# Patient Record
Sex: MALE | Race: BLACK OR AFRICAN AMERICAN | Employment: FULL TIME | URBAN - METROPOLITAN AREA
[De-identification: names, ages, dates, MRNs, and addresses within clinical notes are randomized per-mention and may not be internally consistent; named-entity substitution may affect disease eponyms.]

---

## 2018-03-21 ENCOUNTER — APPOINTMENT (OUTPATIENT)
Dept: GENERAL RADIOLOGY | Age: 38
End: 2018-03-21
Attending: PHYSICIAN ASSISTANT
Payer: SELF-PAY

## 2018-03-21 ENCOUNTER — HOSPITAL ENCOUNTER (EMERGENCY)
Age: 38
Discharge: HOME | End: 2018-03-21
Attending: EMERGENCY MEDICINE
Payer: SELF-PAY

## 2018-03-21 VITALS
WEIGHT: 176.8 LBS | RESPIRATION RATE: 18 BRPM | DIASTOLIC BLOOD PRESSURE: 89 MMHG | TEMPERATURE: 98.2 F | BODY MASS INDEX: 23.95 KG/M2 | HEART RATE: 85 BPM | SYSTOLIC BLOOD PRESSURE: 127 MMHG | OXYGEN SATURATION: 100 % | HEIGHT: 72 IN

## 2018-03-21 DIAGNOSIS — A08.4 VIRAL GASTROENTERITIS: Primary | ICD-10-CM

## 2018-03-21 LAB
ALBUMIN SERPL-MCNC: 4.4 G/DL (ref 3.5–5)
ALBUMIN/GLOB SERPL: 1.4 {RATIO} (ref 1.1–2.2)
ALP SERPL-CCNC: 79 U/L (ref 45–117)
ALT SERPL-CCNC: 16 U/L (ref 12–78)
ANION GAP SERPL CALC-SCNC: 14 MMOL/L (ref 5–15)
AST SERPL-CCNC: 23 U/L (ref 15–37)
ATRIAL RATE: 80 BPM
BASOPHILS # BLD: 0 K/UL (ref 0–0.1)
BASOPHILS NFR BLD: 0 % (ref 0–1)
BILIRUB SERPL-MCNC: 0.6 MG/DL (ref 0.2–1)
BUN SERPL-MCNC: 14 MG/DL (ref 6–20)
BUN/CREAT SERPL: 14 (ref 12–20)
CALCIUM SERPL-MCNC: 9.3 MG/DL (ref 8.5–10.1)
CALCULATED P AXIS, ECG09: 75 DEGREES
CALCULATED R AXIS, ECG10: 70 DEGREES
CALCULATED T AXIS, ECG11: 59 DEGREES
CHLORIDE SERPL-SCNC: 102 MMOL/L (ref 97–108)
CO2 SERPL-SCNC: 23 MMOL/L (ref 21–32)
CREAT SERPL-MCNC: 1.02 MG/DL (ref 0.7–1.3)
DIAGNOSIS, 93000: NORMAL
DIFFERENTIAL METHOD BLD: ABNORMAL
EOSINOPHIL # BLD: 0 K/UL (ref 0–0.4)
EOSINOPHIL NFR BLD: 0 % (ref 0–7)
ERYTHROCYTE [DISTWIDTH] IN BLOOD BY AUTOMATED COUNT: 14.5 % (ref 11.5–14.5)
GLOBULIN SER CALC-MCNC: 3.2 G/DL (ref 2–4)
GLUCOSE SERPL-MCNC: 73 MG/DL (ref 65–100)
HCT VFR BLD AUTO: 43.6 % (ref 36.6–50.3)
HGB BLD-MCNC: 14.6 G/DL (ref 12.1–17)
IMM GRANULOCYTES # BLD: 0.1 K/UL (ref 0–0.04)
IMM GRANULOCYTES NFR BLD AUTO: 0 % (ref 0–0.5)
LIPASE SERPL-CCNC: 131 U/L (ref 73–393)
LYMPHOCYTES # BLD: 1.3 K/UL (ref 0.8–3.5)
LYMPHOCYTES NFR BLD: 6 % (ref 12–49)
MCH RBC QN AUTO: 30.4 PG (ref 26–34)
MCHC RBC AUTO-ENTMCNC: 33.5 G/DL (ref 30–36.5)
MCV RBC AUTO: 90.8 FL (ref 80–99)
MONOCYTES # BLD: 1.4 K/UL (ref 0–1)
MONOCYTES NFR BLD: 7 % (ref 5–13)
NEUTS SEG # BLD: 18 K/UL (ref 1.8–8)
NEUTS SEG NFR BLD: 87 % (ref 32–75)
NRBC # BLD: 0 K/UL (ref 0–0.01)
NRBC BLD-RTO: 0 PER 100 WBC
P-R INTERVAL, ECG05: 150 MS
PLATELET # BLD AUTO: 250 K/UL (ref 150–400)
PMV BLD AUTO: 10.9 FL (ref 8.9–12.9)
POTASSIUM SERPL-SCNC: 3.6 MMOL/L (ref 3.5–5.1)
PROT SERPL-MCNC: 7.6 G/DL (ref 6.4–8.2)
Q-T INTERVAL, ECG07: 374 MS
QRS DURATION, ECG06: 92 MS
QTC CALCULATION (BEZET), ECG08: 431 MS
RBC # BLD AUTO: 4.8 M/UL (ref 4.1–5.7)
SODIUM SERPL-SCNC: 139 MMOL/L (ref 136–145)
TROPONIN I SERPL-MCNC: <0.04 NG/ML
VENTRICULAR RATE, ECG03: 80 BPM
WBC # BLD AUTO: 20.8 K/UL (ref 4.1–11.1)

## 2018-03-21 PROCEDURE — 96374 THER/PROPH/DIAG INJ IV PUSH: CPT

## 2018-03-21 PROCEDURE — 99283 EMERGENCY DEPT VISIT LOW MDM: CPT

## 2018-03-21 PROCEDURE — 93005 ELECTROCARDIOGRAM TRACING: CPT

## 2018-03-21 PROCEDURE — 83690 ASSAY OF LIPASE: CPT | Performed by: NURSE PRACTITIONER

## 2018-03-21 PROCEDURE — 85025 COMPLETE CBC W/AUTO DIFF WBC: CPT | Performed by: PHYSICIAN ASSISTANT

## 2018-03-21 PROCEDURE — 71046 X-RAY EXAM CHEST 2 VIEWS: CPT

## 2018-03-21 PROCEDURE — 84484 ASSAY OF TROPONIN QUANT: CPT | Performed by: PHYSICIAN ASSISTANT

## 2018-03-21 PROCEDURE — 96361 HYDRATE IV INFUSION ADD-ON: CPT

## 2018-03-21 PROCEDURE — 80053 COMPREHEN METABOLIC PANEL: CPT | Performed by: PHYSICIAN ASSISTANT

## 2018-03-21 PROCEDURE — 74011000250 HC RX REV CODE- 250

## 2018-03-21 PROCEDURE — 74011250636 HC RX REV CODE- 250/636: Performed by: NURSE PRACTITIONER

## 2018-03-21 PROCEDURE — 36415 COLL VENOUS BLD VENIPUNCTURE: CPT | Performed by: NURSE PRACTITIONER

## 2018-03-21 PROCEDURE — 96375 TX/PRO/DX INJ NEW DRUG ADDON: CPT

## 2018-03-21 RX ORDER — FAMOTIDINE 10 MG/ML
INJECTION INTRAVENOUS
Status: COMPLETED
Start: 2018-03-21 | End: 2018-03-21

## 2018-03-21 RX ORDER — KETOROLAC TROMETHAMINE 30 MG/ML
30 INJECTION, SOLUTION INTRAMUSCULAR; INTRAVENOUS
Status: COMPLETED | OUTPATIENT
Start: 2018-03-21 | End: 2018-03-21

## 2018-03-21 RX ORDER — ONDANSETRON 2 MG/ML
4 INJECTION INTRAMUSCULAR; INTRAVENOUS
Status: COMPLETED | OUTPATIENT
Start: 2018-03-21 | End: 2018-03-21

## 2018-03-21 RX ORDER — ONDANSETRON 4 MG/1
4 TABLET, ORALLY DISINTEGRATING ORAL
Qty: 20 TAB | Refills: 0 | Status: SHIPPED | OUTPATIENT
Start: 2018-03-21

## 2018-03-21 RX ADMIN — ONDANSETRON 4 MG: 2 INJECTION INTRAMUSCULAR; INTRAVENOUS at 14:28

## 2018-03-21 RX ADMIN — KETOROLAC TROMETHAMINE 30 MG: 30 INJECTION, SOLUTION INTRAMUSCULAR at 14:28

## 2018-03-21 RX ADMIN — FAMOTIDINE: 10 INJECTION, SOLUTION INTRAVENOUS at 14:28

## 2018-03-21 RX ADMIN — SODIUM CHLORIDE 1000 ML: 900 INJECTION, SOLUTION INTRAVENOUS at 14:27

## 2018-03-21 NOTE — LETTER
NOTIFICATION RETURN TO WORK / SCHOOL 
 
3/21/2018 3:47 PM 
 
Mr. Umu Castaneda [place] [address] [place], [place] [id number] To Whom It May Concern: 
 
Umu Castaneda is currently under the care of [address]. He will return to work/school on:  March 23, 2018 If there are questions or concerns please have the patient contact our office.  
 
 
 
Sincerely, 
 
 
 
Timo Gómez NP 
3:47 PM

## 2018-03-21 NOTE — DISCHARGE INSTRUCTIONS
Gastroenteritis: Care Instructions  Your Care Instructions    Gastroenteritis is an illness that may cause nausea, vomiting, and diarrhea. It is sometimes called \"stomach flu. \" It can be caused by bacteria or a virus. You will probably begin to feel better in 1 to 2 days. In the meantime, get plenty of rest and make sure you do not become dehydrated. Dehydration occurs when your body loses too much fluid. Follow-up care is a key part of your treatment and safety. Be sure to make and go to all appointments, and call your doctor if you are having problems. It's also a good idea to know your test results and keep a list of the medicines you take. How can you care for yourself at home? · If your doctor prescribed antibiotics, take them as directed. Do not stop taking them just because you feel better. You need to take the full course of antibiotics. · Drink plenty of fluids to prevent dehydration, enough so that your urine is light yellow or clear like water. Choose water and other caffeine-free clear liquids until you feel better. If you have kidney, heart, or liver disease and have to limit fluids, talk with your doctor before you increase your fluid intake. · Drink fluids slowly, in frequent, small amounts, because drinking too much too fast can cause vomiting. · Begin eating mild foods, such as dry toast, yogurt, applesauce, bananas, and rice. Avoid spicy, hot, or high-fat foods, and do not drink alcohol or caffeine for a day or two. Do not drink milk or eat ice cream until you are feeling better. How to prevent gastroenteritis  · Keep hot foods hot and cold foods cold. · Do not eat meats, dressings, salads, or other foods that have been kept at room temperature for more than 2 hours. · Use a thermometer to check your refrigerator. It should be between 34°F and 40°F.  · Defrost meats in the refrigerator or microwave, not on the kitchen counter. · Keep your hands and your kitchen clean.  Wash your hands, cutting boards, and countertops with hot soapy water frequently. · Cook meat until it is well done. · Do not eat raw eggs or uncooked sauces made with raw eggs. · Do not take chances. If food looks or tastes spoiled, throw it out. When should you call for help? Call 911 anytime you think you may need emergency care. For example, call if:  ? · You vomit blood or what looks like coffee grounds. ? · You passed out (lost consciousness). ? · You pass maroon or very bloody stools. ?Call your doctor now or seek immediate medical care if:  ? · You have severe belly pain. ? · You have signs of needing more fluids. You have sunken eyes, a dry mouth, and pass only a little dark urine. ? · You feel like you are going to faint. ? · You have increased belly pain that does not go away in 1 to 2 days. ? · You have new or increased nausea, or you are vomiting. ? · You have a new or higher fever. ? · Your stools are black and tarlike or have streaks of blood. ? Watch closely for changes in your health, and be sure to contact your doctor if:  ? · You are dizzy or lightheaded. ? · You urinate less than usual, or your urine is dark yellow or brown. ? · You do not feel better with each day that goes by. Where can you learn more? Go to http://selene-javon.info/. Enter N142 in the search box to learn more about \"Gastroenteritis: Care Instructions. \"  Current as of: March 3, 2017  Content Version: 11.4  © 1515-0748 [employer]. Care instructions adapted under license by [employer] (which disclaims liability or warranty for this information). If you have questions about a medical condition or this instruction, always ask your healthcare professional. [address] any warranty or liability for your use of this information. We hope that we have addressed all of your medical concerns.  The examination and treatment you received in the Emergency Department were for an emergent problem and were not intended as complete care. It is important that you follow up with your healthcare provider(s) for ongoing care. If your symptoms worsen or do not improve as expected, and you are unable to reach your usual health care provider(s), you should return to the Emergency Department. Today's healthcare is undergoing tremendous change, and patient satisfaction surveys are one of the many tools to assess the quality of medical care. You may receive a survey from the [employer] organization regarding your experience in the Emergency Department. I hope that your experience has been completely positive, particularly the medical care that I provided. As such, please participate in the survey; anything less than excellent does not meet my expectations or intentions. [facility] and [facility] participate in nationally recognized quality of care measures. If your blood pressure is greater than 120/80, as reported below, we urge that you seek medical care to address the potential of high blood pressure, commonly known as hypertension. Hypertension can be hereditary or can be caused by certain medical conditions, pain, stress, or \"white coat syndrome. \"       Please make an appointment with your health care provider(s) for follow up of your Emergency Department visit. VITALS:   Patient Vitals for the past 8 hrs:   Temp Pulse Resp BP SpO2   03/21/18 1329 98.2 °F (36.8 °C) 85 18 127/89 100 %          Thank you for allowing us to provide you with medical care today. We realize that you have many choices for your emergency care needs. Please choose us in the future for any continued health care needs. Amparo Rebollar, [address] [place] [address].   Office: [phone]            Recent Results (from the past 24 hour(s))   EKG, 12 LEAD, INITIAL    Collection Time: 03/21/18  1:37 PM   Result Value Ref Range    Ventricular Rate 80 BPM    Atrial Rate 80 BPM    P-R Interval 150 ms    QRS Duration 92 ms    Q-T Interval 374 ms    QTC Calculation (Bezet) 431 ms    Calculated P Axis 75 degrees    Calculated R Axis 70 degrees    Calculated T Axis 59 degrees    Diagnosis       Normal sinus rhythm  Biatrial enlargement  No previous ECGs available     CBC WITH AUTOMATED DIFF    Collection Time: 03/21/18  1:40 PM   Result Value Ref Range    WBC 20.8 (H) 4.1 - 11.1 K/uL    RBC 4.80 4. 10 - 5.70 M/uL    HGB 14.6 12.1 - 17.0 g/dL    HCT 43.6 36.6 - 50.3 %    MCV 90.8 80.0 - 99.0 FL    MCH 30.4 26.0 - 34.0 PG    MCHC 33.5 30.0 - 36.5 g/dL    RDW 14.5 11.5 - 14.5 %    PLATELET 159 354 - 006 K/uL    MPV 10.9 8.9 - 12.9 FL    NRBC 0.0 0  WBC    ABSOLUTE NRBC 0.00 0.00 - 0.01 K/uL    NEUTROPHILS 87 (H) 32 - 75 %    LYMPHOCYTES 6 (L) 12 - 49 %    MONOCYTES 7 5 - 13 %    EOSINOPHILS 0 0 - 7 %    BASOPHILS 0 0 - 1 %    IMMATURE GRANULOCYTES 0 0.0 - 0.5 %    ABS. NEUTROPHILS 18.0 (H) 1.8 - 8.0 K/UL    ABS. LYMPHOCYTES 1.3 0.8 - 3.5 K/UL    ABS. MONOCYTES 1.4 (H) 0.0 - 1.0 K/UL    ABS. EOSINOPHILS 0.0 0.0 - 0.4 K/UL    ABS. BASOPHILS 0.0 0.0 - 0.1 K/UL    ABS. IMM. GRANS. 0.1 (H) 0.00 - 0.04 K/UL    DF AUTOMATED     METABOLIC PANEL, COMPREHENSIVE    Collection Time: 03/21/18  1:40 PM   Result Value Ref Range    Sodium 139 136 - 145 mmol/L    Potassium 3.6 3.5 - 5.1 mmol/L    Chloride 102 97 - 108 mmol/L    CO2 23 21 - 32 mmol/L    Anion gap 14 5 - 15 mmol/L    Glucose 73 65 - 100 mg/dL    BUN 14 6 - 20 MG/DL    Creatinine 1.02 0.70 - 1.30 MG/DL    BUN/Creatinine ratio 14 12 - 20      GFR est AA >60 >60 ml/min/1.73m2    GFR est non-AA >60 >60 ml/min/1.73m2    Calcium 9.3 8.5 - 10.1 MG/DL    Bilirubin, total 0.6 0.2 - 1.0 MG/DL    ALT (SGPT) 16 12 - 78 U/L    AST (SGOT) 23 15 - 37 U/L    Alk.  phosphatase 79 45 - 117 U/L    Protein, total 7.6 6.4 - 8.2 g/dL    Albumin 4.4 3.5 - 5.0 g/dL    Globulin 3.2 2.0 - 4.0 g/dL    A-G Ratio 1.4 1.1 - 2.2     TROPONIN I    Collection Time: 03/21/18  1:40 PM   Result Value Ref Range    Troponin-I, Qt. <0.04 <0.05 ng/mL   LIPASE    Collection Time: 03/21/18  1:40 PM   Result Value Ref Range    Lipase 131 73 - 393 U/L       Xr Chest Pa Lat    Result Date: 3/21/2018  Exam:  2 view chest Indication: Cough nausea and vomiting, productive cough with clear phlegm. COMPARISON: 11-2-16 PA and lateral views demonstrate normal heart size. There is no acute process in the lung fields. The osseous structures are unremarkable. Impression: No acute process.  No pneumonia

## 2018-03-21 NOTE — ED NOTES

## 2018-03-21 NOTE — ED PROVIDER NOTES
HPI Comments: 30 y.o. male with no significant past medical history who presents ambulatory from home accompanied by his mother with chief complaint of vomiting. Patient notes waking up diaphoretic this morning with 5 subsequent episodes of vomiting and 4 episodes of non-bloody diarrhea without visible mucus in stool. Patient reports associated dizziness and ongoing cough. Patient reports consuming 24 oz of beer last night, a regular occurrence for him. Patient notes recent diagnosis of seasonal allergies. Patient regularly smokes cigarettes and uses vaporizers. Pt denies fevers, chills, night sweats, chest pain, pressure, SOB, PERES, PND, orthopnea, abdominal pain, melena, hematuria, dysuria, constipation, HA, dizziness, and syncope. There are no other acute medical concerns at this time. Old Chart Review:  Patient evaluated in the ED on 11/2/16 with likely URI/bronchitis. Social hx: current every day tobacco smoker; uses vaporizer pens; daily EtOH use (24 oz beer/night); denies illicit drug use  PCP: None    Note written by Ronald Ray, as dictated by Matty Guzman NP 1:53 PM          The history is provided by the patient, a parent and medical records (mother). No  was used. History reviewed. No pertinent past medical history. Past Surgical History:   Procedure Laterality Date    HX ORTHOPAEDIC      Right ankle sugery         History reviewed. No pertinent family history. Social History     Social History    Marital status: SINGLE     Spouse name: N/A    Number of children: N/A    Years of education: N/A     Occupational History    Not on file.      Social History Main Topics    Smoking status: Current Every Day Smoker    Smokeless tobacco: Not on file    Alcohol use Yes      Comment: social    Drug use: Not on file    Sexual activity: Not on file     Other Topics Concern    Not on file     Social History Narrative         ALLERGIES: Review of patient's allergies indicates no known allergies. Review of Systems   Constitutional: Positive for diaphoresis. Negative for chills and fever. HENT: Negative for congestion. Respiratory: Positive for cough. Gastrointestinal: Positive for diarrhea, nausea and vomiting. Negative for abdominal pain. Neurological: Positive for dizziness. Negative for tremors. All other systems reviewed and are negative. Vitals:    03/21/18 1329   BP: 127/89   Pulse: 85   Resp: 18   Temp: 98.2 °F (36.8 °C)   SpO2: 100%   Weight: 80.2 kg (176 lb 12.8 oz)   Height: 6' (1.829 m)            Physical Exam   Constitutional: He is oriented to person, place, and time. He appears well-developed and well-nourished. No distress. HENT:   Head: Atraumatic. Nose: Nose normal.   Mouth/Throat: No oropharyngeal exudate. Eyes: Conjunctivae and EOM are normal. Right eye exhibits no discharge. Left eye exhibits no discharge. No scleral icterus. Neck: Normal range of motion. Neck supple. No JVD present. No tracheal deviation present. No thyromegaly present. Cardiovascular: Normal rate and regular rhythm. Exam reveals no gallop and no friction rub. No murmur heard. Pulmonary/Chest: Breath sounds normal. No stridor. No respiratory distress. He has no wheezes. He has no rales. He exhibits no tenderness. Abdominal: Soft. Bowel sounds are normal. He exhibits no distension and no mass. There is no tenderness. There is no rebound and no guarding. Musculoskeletal: Normal range of motion. He exhibits no edema or tenderness. Lymphadenopathy:     He has no cervical adenopathy. Neurological: He is alert and oriented to person, place, and time. Coordination normal.   Skin: Skin is warm and dry. He is not diaphoretic. Psychiatric: He has a normal mood and affect. His behavior is normal.   Nursing note and vitals reviewed.        MDM  Number of Diagnoses or Management Options  Viral gastroenteritis:   Diagnosis management comments:    * routine laboratory data    * IVF   * Toradol, zofran, and pepcid   * Po challenge   * CXR       Amount and/or Complexity of Data Reviewed  Clinical lab tests: ordered and reviewed  Tests in the radiology section of CPT®: ordered and reviewed  Discussion of test results with the performing providers: yes  Review and summarize past medical records: yes    Risk of Complications, Morbidity, and/or Mortality  General comments:    - stable, ambulatory pt in NAD    Patient Progress  Patient progress: stable        ED Course       Procedures    ED EKG Interpretation:  Rhythm: normal sinus rhythm and regular . Rate (approx.): 80; Axis: normal; Intervals: normal; ST/T wave: normal. Biatrial enlargement. Reviewed by Hawa Camejo MD, attending ED provider  Note written by Ronald Dwyer, as dictated by Felicia Mcpherson NP 1:37 PM    The patient was counseled on the dangers of tobacco use, and was advised to quit. Reviewed strategies to maximize success, including removing cigarettes and smoking materials from environment. 3:48 PM  WBC 20.8. Troponin, lipase negative. Patient reports improvement with medications. No episodes of vomiting in the ED. Tolerating PO fluids well. Discussed available lab and imaging results with patient and mother. Both verbalize understanding and agree with care plan. Will discharge patient home with specific return precautions; prescription Zofran; f/u with PCP. Patient's results have been reviewed with them. Patient and/or family have verbally conveyed their understanding and agreement of the patient's signs, symptoms, diagnosis, treatment and prognosis and additionally agree to follow up as recommended or return to the Emergency Room should their condition change prior to follow-up.   Discharge instructions have also been provided to the patient with some educational information regarding their diagnosis as well a list of reasons why they would want to return to the ER prior to their follow-up appointment should their condition change. 3:59 PM  Pt has been reevaluated. There are no new complaints, changes, or physical findings at this time. Medications have been reviewed w/ pt and/or family. Pt and/or family's questions have been answered. Pt and/or family expressed good understanding of the dx/tx/rx and is in agreement with plan of care. Pt instructed and agreed to f/u w/ PCP and to return to ED upon further deterioration. Pt is ready for discharge. LABORATORY TESTS:  Recent Results (from the past 12 hour(s))   EKG, 12 LEAD, INITIAL    Collection Time: 03/21/18  1:37 PM   Result Value Ref Range    Ventricular Rate 80 BPM    Atrial Rate 80 BPM    P-R Interval 150 ms    QRS Duration 92 ms    Q-T Interval 374 ms    QTC Calculation (Bezet) 431 ms    Calculated P Axis 75 degrees    Calculated R Axis 70 degrees    Calculated T Axis 59 degrees    Diagnosis       Normal sinus rhythm  Biatrial enlargement  No previous ECGs available     CBC WITH AUTOMATED DIFF    Collection Time: 03/21/18  1:40 PM   Result Value Ref Range    WBC 20.8 (H) 4.1 - 11.1 K/uL    RBC 4.80 4. 10 - 5.70 M/uL    HGB 14.6 12.1 - 17.0 g/dL    HCT 43.6 36.6 - 50.3 %    MCV 90.8 80.0 - 99.0 FL    MCH 30.4 26.0 - 34.0 PG    MCHC 33.5 30.0 - 36.5 g/dL    RDW 14.5 11.5 - 14.5 %    PLATELET 681 571 - 890 K/uL    MPV 10.9 8.9 - 12.9 FL    NRBC 0.0 0  WBC    ABSOLUTE NRBC 0.00 0.00 - 0.01 K/uL    NEUTROPHILS 87 (H) 32 - 75 %    LYMPHOCYTES 6 (L) 12 - 49 %    MONOCYTES 7 5 - 13 %    EOSINOPHILS 0 0 - 7 %    BASOPHILS 0 0 - 1 %    IMMATURE GRANULOCYTES 0 0.0 - 0.5 %    ABS. NEUTROPHILS 18.0 (H) 1.8 - 8.0 K/UL    ABS. LYMPHOCYTES 1.3 0.8 - 3.5 K/UL    ABS. MONOCYTES 1.4 (H) 0.0 - 1.0 K/UL    ABS. EOSINOPHILS 0.0 0.0 - 0.4 K/UL    ABS. BASOPHILS 0.0 0.0 - 0.1 K/UL    ABS. IMM.  GRANS. 0.1 (H) 0.00 - 0.04 K/UL    DF AUTOMATED     METABOLIC PANEL, COMPREHENSIVE    Collection Time: 03/21/18  1:40 PM   Result Value Ref Range    Sodium 139 136 - 145 mmol/L    Potassium 3.6 3.5 - 5.1 mmol/L    Chloride 102 97 - 108 mmol/L    CO2 23 21 - 32 mmol/L    Anion gap 14 5 - 15 mmol/L    Glucose 73 65 - 100 mg/dL    BUN 14 6 - 20 MG/DL    Creatinine 1.02 0.70 - 1.30 MG/DL    BUN/Creatinine ratio 14 12 - 20      GFR est AA >60 >60 ml/min/1.73m2    GFR est non-AA >60 >60 ml/min/1.73m2    Calcium 9.3 8.5 - 10.1 MG/DL    Bilirubin, total 0.6 0.2 - 1.0 MG/DL    ALT (SGPT) 16 12 - 78 U/L    AST (SGOT) 23 15 - 37 U/L    Alk. phosphatase 79 45 - 117 U/L    Protein, total 7.6 6.4 - 8.2 g/dL    Albumin 4.4 3.5 - 5.0 g/dL    Globulin 3.2 2.0 - 4.0 g/dL    A-G Ratio 1.4 1.1 - 2.2     TROPONIN I    Collection Time: 03/21/18  1:40 PM   Result Value Ref Range    Troponin-I, Qt. <0.04 <0.05 ng/mL   LIPASE    Collection Time: 03/21/18  1:40 PM   Result Value Ref Range    Lipase 131 73 - 393 U/L       IMAGING RESULTS:  XR CHEST PA LAT   Final Result        Xr Chest Pa Lat    Result Date: 3/21/2018  Exam:  2 view chest Indication: Cough nausea and vomiting, productive cough with clear phlegm. COMPARISON: 11-2-16 PA and lateral views demonstrate normal heart size. There is no acute process in the lung fields. The osseous structures are unremarkable. Impression: No acute process. No pneumonia         MEDICATIONS GIVEN:  Medications   famotidine (PF) (PEPCID) 20 mg in sodium chloride 10 mL injection ( IntraVENous Canceled Entry 3/21/18 1355)   sodium chloride 0.9 % bolus infusion 1,000 mL (0 mL IntraVENous IV Completed 3/21/18 1527)   ondansetron (ZOFRAN) injection 4 mg (4 mg IntraVENous Given 3/21/18 1428)   ketorolac (TORADOL) injection 30 mg (30 mg IntraVENous Given 3/21/18 1428)   famotidine (PF) (PEPCID) 20 mg/2 mL injection (  Given 3/21/18 2943)       IMPRESSION:  1. Viral gastroenteritis        PLAN:  1.    Discharge Medication List as of 3/21/2018  3:47 PM      START taking these medications    Details   ondansetron (ZOFRAN ODT) 4 mg disintegrating tablet Take 1 Tab by mouth every eight (8) hours as needed for Nausea. , Print, Disp-20 Tab, R-0           2. Follow-up Information     Follow up With Details Comments [address] In 2 days  [address]    [facility] EMERGENCY DEP  As needed, If symptoms worsen Karyna  [phone]        3.  Supportive care     Return to ED if worse       Isamar Alanis, NP  3:59 PM

## 2018-03-21 NOTE — ED TRIAGE NOTES
Pt complains of dizziness, n/v/d and back pain that started today. Denies abd pain. Note productive cough clear flem in triage.

## 2020-02-02 ENCOUNTER — APPOINTMENT (OUTPATIENT)
Dept: ULTRASOUND IMAGING | Age: 40
DRG: 351 | End: 2020-02-02
Attending: INTERNAL MEDICINE
Payer: MEDICAID

## 2020-02-02 ENCOUNTER — HOSPITAL ENCOUNTER (INPATIENT)
Age: 40
LOS: 5 days | Discharge: LEFT BEFORE DISCHARGE | DRG: 351 | End: 2020-02-07
Attending: EMERGENCY MEDICINE | Admitting: INTERNAL MEDICINE
Payer: MEDICAID

## 2020-02-02 ENCOUNTER — APPOINTMENT (OUTPATIENT)
Dept: GENERAL RADIOLOGY | Age: 40
DRG: 351 | End: 2020-02-02
Attending: EMERGENCY MEDICINE
Payer: MEDICAID

## 2020-02-02 ENCOUNTER — APPOINTMENT (OUTPATIENT)
Dept: CT IMAGING | Age: 40
DRG: 351 | End: 2020-02-02
Attending: EMERGENCY MEDICINE
Payer: MEDICAID

## 2020-02-02 DIAGNOSIS — R41.82 ALTERED MENTAL STATUS, UNSPECIFIED ALTERED MENTAL STATUS TYPE: ICD-10-CM

## 2020-02-02 DIAGNOSIS — F19.929 DRUG INTOXICATION WITH COMPLICATION (HCC): ICD-10-CM

## 2020-02-02 DIAGNOSIS — I21.3 ST ELEVATION (STEMI) MYOCARDIAL INFARCTION (HCC): ICD-10-CM

## 2020-02-02 DIAGNOSIS — I21.09 ST ELEVATION MYOCARDIAL INFARCTION (STEMI) OF ANTERIOR WALL (HCC): Primary | ICD-10-CM

## 2020-02-02 DIAGNOSIS — F19.930 SUBSTANCE WITHDRAWAL WITHOUT COMPLICATION (HCC): ICD-10-CM

## 2020-02-02 LAB
ALBUMIN SERPL-MCNC: 3.8 G/DL (ref 3.5–5)
ALBUMIN SERPL-MCNC: 4 G/DL (ref 3.5–5)
ALBUMIN SERPL-MCNC: 4.7 G/DL (ref 3.5–5)
ALBUMIN/GLOB SERPL: 1.1 {RATIO} (ref 1.1–2.2)
ALP SERPL-CCNC: 111 U/L (ref 45–117)
ALP SERPL-CCNC: 92 U/L (ref 45–117)
ALP SERPL-CCNC: 95 U/L (ref 45–117)
ALT SERPL-CCNC: 1721 U/L (ref 12–78)
ALT SERPL-CCNC: 1752 U/L (ref 12–78)
ALT SERPL-CCNC: 2016 U/L (ref 12–78)
AMORPH CRY URNS QL MICRO: ABNORMAL
AMPHET UR QL SCN: POSITIVE
ANION GAP SERPL CALC-SCNC: 4 MMOL/L (ref 5–15)
ANION GAP SERPL CALC-SCNC: 5 MMOL/L (ref 5–15)
ANION GAP SERPL CALC-SCNC: 6 MMOL/L (ref 5–15)
APPEARANCE UR: ABNORMAL
ARTERIAL PATENCY WRIST A: ABNORMAL
AST SERPL-CCNC: >2000 U/L (ref 15–37)
ATRIAL RATE: 85 BPM
B PERT DNA SPEC QL NAA+PROBE: NOT DETECTED
BACTERIA URNS QL MICRO: ABNORMAL /HPF
BARBITURATES UR QL SCN: NEGATIVE
BASE EXCESS BLD CALC-SCNC: 2 MMOL/L
BASOPHILS # BLD: 0 K/UL (ref 0–0.1)
BASOPHILS NFR BLD: 0 % (ref 0–1)
BDY SITE: ABNORMAL
BENZODIAZ UR QL: NEGATIVE
BILIRUB DIRECT SERPL-MCNC: <0.1 MG/DL (ref 0–0.2)
BILIRUB SERPL-MCNC: 0.4 MG/DL (ref 0.2–1)
BILIRUB SERPL-MCNC: 0.7 MG/DL (ref 0.2–1)
BILIRUB SERPL-MCNC: 0.7 MG/DL (ref 0.2–1)
BILIRUB UR QL: NEGATIVE
BORDETELLA PARAPERTUSSIS PCR, BORPAR: NOT DETECTED
BUN SERPL-MCNC: 31 MG/DL (ref 6–20)
BUN SERPL-MCNC: 31 MG/DL (ref 6–20)
BUN SERPL-MCNC: 34 MG/DL (ref 6–20)
BUN/CREAT SERPL: 12 (ref 12–20)
BUN/CREAT SERPL: 13 (ref 12–20)
BUN/CREAT SERPL: 16 (ref 12–20)
C PNEUM DNA SPEC QL NAA+PROBE: NOT DETECTED
CA-I BLD-SCNC: 1.1 MMOL/L (ref 1.12–1.32)
CALCIUM SERPL-MCNC: 7.8 MG/DL (ref 8.5–10.1)
CALCIUM SERPL-MCNC: 8.3 MG/DL (ref 8.5–10.1)
CALCIUM SERPL-MCNC: 8.7 MG/DL (ref 8.5–10.1)
CALCULATED P AXIS, ECG09: 71 DEGREES
CALCULATED R AXIS, ECG10: 62 DEGREES
CALCULATED T AXIS, ECG11: 41 DEGREES
CANNABINOIDS UR QL SCN: NEGATIVE
CHLORIDE SERPL-SCNC: 102 MMOL/L (ref 97–108)
CHLORIDE SERPL-SCNC: 97 MMOL/L (ref 97–108)
CHLORIDE SERPL-SCNC: 97 MMOL/L (ref 97–108)
CK SERPL-CCNC: ABNORMAL U/L (ref 39–308)
CO2 SERPL-SCNC: 26 MMOL/L (ref 21–32)
CO2 SERPL-SCNC: 28 MMOL/L (ref 21–32)
CO2 SERPL-SCNC: 29 MMOL/L (ref 21–32)
COCAINE UR QL SCN: POSITIVE
COLOR UR: ABNORMAL
COMMENT, HOLDF: NORMAL
COMMENT, HOLDF: NORMAL
CREAT SERPL-MCNC: 1.91 MG/DL (ref 0.7–1.3)
CREAT SERPL-MCNC: 2.37 MG/DL (ref 0.7–1.3)
CREAT SERPL-MCNC: 2.77 MG/DL (ref 0.7–1.3)
DIAGNOSIS, 93000: NORMAL
DIFFERENTIAL METHOD BLD: ABNORMAL
DRUG SCRN COMMENT,DRGCM: ABNORMAL
EOSINOPHIL # BLD: 0 K/UL (ref 0–0.4)
EOSINOPHIL NFR BLD: 0 % (ref 0–7)
EPITH CASTS URNS QL MICRO: ABNORMAL /LPF
ERYTHROCYTE [DISTWIDTH] IN BLOOD BY AUTOMATED COUNT: 14.7 % (ref 11.5–14.5)
ERYTHROCYTE [DISTWIDTH] IN BLOOD BY AUTOMATED COUNT: 15.2 % (ref 11.5–14.5)
FLUAV H1 2009 PAND RNA SPEC QL NAA+PROBE: NOT DETECTED
FLUAV H1 RNA SPEC QL NAA+PROBE: NOT DETECTED
FLUAV H3 RNA SPEC QL NAA+PROBE: NOT DETECTED
FLUAV SUBTYP SPEC NAA+PROBE: NOT DETECTED
FLUBV RNA SPEC QL NAA+PROBE: NOT DETECTED
GAS FLOW.O2 O2 DELIVERY SYS: ABNORMAL L/MIN
GAS FLOW.O2 SETTING OXYMISER: 15 BPM
GLOBULIN SER CALC-MCNC: 3.6 G/DL (ref 2–4)
GLOBULIN SER CALC-MCNC: 3.6 G/DL (ref 2–4)
GLOBULIN SER CALC-MCNC: 4.1 G/DL (ref 2–4)
GLUCOSE SERPL-MCNC: 104 MG/DL (ref 65–100)
GLUCOSE SERPL-MCNC: 92 MG/DL (ref 65–100)
GLUCOSE SERPL-MCNC: 94 MG/DL (ref 65–100)
GLUCOSE UR STRIP.AUTO-MCNC: NEGATIVE MG/DL
GRAN CASTS URNS QL MICRO: ABNORMAL /LPF
HADV DNA SPEC QL NAA+PROBE: NOT DETECTED
HCO3 BLD-SCNC: 27.8 MMOL/L (ref 22–26)
HCOV 229E RNA SPEC QL NAA+PROBE: NOT DETECTED
HCOV HKU1 RNA SPEC QL NAA+PROBE: NOT DETECTED
HCOV NL63 RNA SPEC QL NAA+PROBE: NOT DETECTED
HCOV OC43 RNA SPEC QL NAA+PROBE: NOT DETECTED
HCT VFR BLD AUTO: 46.8 % (ref 36.6–50.3)
HCT VFR BLD AUTO: 50.7 % (ref 36.6–50.3)
HGB BLD-MCNC: 15.2 G/DL (ref 12.1–17)
HGB BLD-MCNC: 16.7 G/DL (ref 12.1–17)
HGB UR QL STRIP: ABNORMAL
HMPV RNA SPEC QL NAA+PROBE: NOT DETECTED
HPIV1 RNA SPEC QL NAA+PROBE: NOT DETECTED
HPIV2 RNA SPEC QL NAA+PROBE: NOT DETECTED
HPIV3 RNA SPEC QL NAA+PROBE: NOT DETECTED
HPIV4 RNA SPEC QL NAA+PROBE: NOT DETECTED
IMM GRANULOCYTES # BLD AUTO: 0.1 K/UL (ref 0–0.04)
IMM GRANULOCYTES NFR BLD AUTO: 1 % (ref 0–0.5)
INR PPP: 1.4 (ref 0.9–1.1)
KETONES UR QL STRIP.AUTO: ABNORMAL MG/DL
LEUKOCYTE ESTERASE UR QL STRIP.AUTO: ABNORMAL
LYMPHOCYTES # BLD: 1 K/UL (ref 0.8–3.5)
LYMPHOCYTES NFR BLD: 6 % (ref 12–49)
M PNEUMO DNA SPEC QL NAA+PROBE: NOT DETECTED
MAGNESIUM SERPL-MCNC: 1.5 MG/DL (ref 1.6–2.4)
MCH RBC QN AUTO: 28.7 PG (ref 26–34)
MCH RBC QN AUTO: 29 PG (ref 26–34)
MCHC RBC AUTO-ENTMCNC: 32.5 G/DL (ref 30–36.5)
MCHC RBC AUTO-ENTMCNC: 32.9 G/DL (ref 30–36.5)
MCV RBC AUTO: 88.2 FL (ref 80–99)
MCV RBC AUTO: 88.5 FL (ref 80–99)
METHADONE UR QL: NEGATIVE
MONOCYTES # BLD: 0.8 K/UL (ref 0–1)
MONOCYTES NFR BLD: 5 % (ref 5–13)
NEUTS SEG # BLD: 15.3 K/UL (ref 1.8–8)
NEUTS SEG NFR BLD: 88 % (ref 32–75)
NITRITE UR QL STRIP.AUTO: NEGATIVE
NRBC # BLD: 0 K/UL (ref 0–0.01)
NRBC # BLD: 0 K/UL (ref 0–0.01)
NRBC BLD-RTO: 0 PER 100 WBC
NRBC BLD-RTO: 0 PER 100 WBC
O2/TOTAL GAS SETTING VFR VENT: 0.4 %
OPIATES UR QL: POSITIVE
P-R INTERVAL, ECG05: 172 MS
PCO2 BLD: 48.3 MMHG (ref 35–45)
PCP UR QL: NEGATIVE
PEEP RESPIRATORY: 5 CMH2O
PH BLD: 7.37 [PH] (ref 7.35–7.45)
PH UR STRIP: 5 [PH] (ref 5–8)
PHOSPHATE SERPL-MCNC: 1.8 MG/DL (ref 2.6–4.7)
PIP ISTAT,IPIP: 21
PLATELET # BLD AUTO: 144 K/UL (ref 150–400)
PLATELET # BLD AUTO: 178 K/UL (ref 150–400)
PMV BLD AUTO: 10.2 FL (ref 8.9–12.9)
PMV BLD AUTO: 11.1 FL (ref 8.9–12.9)
PO2 BLD: 103 MMHG (ref 80–100)
POTASSIUM SERPL-SCNC: 4.2 MMOL/L (ref 3.5–5.1)
POTASSIUM SERPL-SCNC: 5.8 MMOL/L (ref 3.5–5.1)
POTASSIUM SERPL-SCNC: 6.3 MMOL/L (ref 3.5–5.1)
PROT SERPL-MCNC: 7.4 G/DL (ref 6.4–8.2)
PROT SERPL-MCNC: 7.6 G/DL (ref 6.4–8.2)
PROT SERPL-MCNC: 8.8 G/DL (ref 6.4–8.2)
PROT UR STRIP-MCNC: 100 MG/DL
PROTHROMBIN TIME: 14.4 SEC (ref 9–11.1)
Q-T INTERVAL, ECG07: 340 MS
QRS DURATION, ECG06: 84 MS
QTC CALCULATION (BEZET), ECG08: 404 MS
RBC # BLD AUTO: 5.29 M/UL (ref 4.1–5.7)
RBC # BLD AUTO: 5.75 M/UL (ref 4.1–5.7)
RBC #/AREA URNS HPF: ABNORMAL /HPF (ref 0–5)
RSV RNA SPEC QL NAA+PROBE: NOT DETECTED
RV+EV RNA SPEC QL NAA+PROBE: NOT DETECTED
SAMPLES BEING HELD,HOLD: NORMAL
SAMPLES BEING HELD,HOLD: NORMAL
SAO2 % BLD: 98 % (ref 92–97)
SODIUM SERPL-SCNC: 129 MMOL/L (ref 136–145)
SODIUM SERPL-SCNC: 130 MMOL/L (ref 136–145)
SODIUM SERPL-SCNC: 135 MMOL/L (ref 136–145)
SP GR UR REFRACTOMETRY: 1.02 (ref 1–1.03)
SPECIMEN TYPE: ABNORMAL
TOTAL RESP. RATE, ITRR: 26
TROPONIN I BLD-MCNC: 1.02 NG/ML (ref 0–0.08)
UR CULT HOLD, URHOLD: NORMAL
UROBILINOGEN UR QL STRIP.AUTO: 0.2 EU/DL (ref 0.2–1)
VENTILATION MODE VENT: ABNORMAL
VENTRICULAR RATE, ECG03: 85 BPM
VT SETTING VENT: 500 ML
WBC # BLD AUTO: 15.7 K/UL (ref 4.1–11.1)
WBC # BLD AUTO: 17.2 K/UL (ref 4.1–11.1)
WBC URNS QL MICRO: ABNORMAL /HPF (ref 0–4)

## 2020-02-02 PROCEDURE — 80053 COMPREHEN METABOLIC PANEL: CPT

## 2020-02-02 PROCEDURE — 76700 US EXAM ABDOM COMPLETE: CPT

## 2020-02-02 PROCEDURE — 4A023N7 MEASUREMENT OF CARDIAC SAMPLING AND PRESSURE, LEFT HEART, PERCUTANEOUS APPROACH: ICD-10-PCS | Performed by: INTERNAL MEDICINE

## 2020-02-02 PROCEDURE — 77030004532 HC CATH ANGI DX IMP BSC -A: Performed by: INTERNAL MEDICINE

## 2020-02-02 PROCEDURE — 74011000250 HC RX REV CODE- 250: Performed by: INTERNAL MEDICINE

## 2020-02-02 PROCEDURE — 36415 COLL VENOUS BLD VENIPUNCTURE: CPT

## 2020-02-02 PROCEDURE — 74011250637 HC RX REV CODE- 250/637: Performed by: INTERNAL MEDICINE

## 2020-02-02 PROCEDURE — 77030019569 HC BND COMPR RAD TERU -B: Performed by: INTERNAL MEDICINE

## 2020-02-02 PROCEDURE — 77030010221 HC SPLNT WR POS TELE -B: Performed by: INTERNAL MEDICINE

## 2020-02-02 PROCEDURE — 77030029065 HC DRSG HEMO QCLOT ZMED -B

## 2020-02-02 PROCEDURE — 84100 ASSAY OF PHOSPHORUS: CPT

## 2020-02-02 PROCEDURE — 85610 PROTHROMBIN TIME: CPT

## 2020-02-02 PROCEDURE — 82803 BLOOD GASES ANY COMBINATION: CPT

## 2020-02-02 PROCEDURE — 74011000250 HC RX REV CODE- 250: Performed by: EMERGENCY MEDICINE

## 2020-02-02 PROCEDURE — 5A1935Z RESPIRATORY VENTILATION, LESS THAN 24 CONSECUTIVE HOURS: ICD-10-PCS | Performed by: EMERGENCY MEDICINE

## 2020-02-02 PROCEDURE — 74011250636 HC RX REV CODE- 250/636: Performed by: EMERGENCY MEDICINE

## 2020-02-02 PROCEDURE — 83735 ASSAY OF MAGNESIUM: CPT

## 2020-02-02 PROCEDURE — 31500 INSERT EMERGENCY AIRWAY: CPT

## 2020-02-02 PROCEDURE — 74011636320 HC RX REV CODE- 636/320: Performed by: INTERNAL MEDICINE

## 2020-02-02 PROCEDURE — 77030013744: Performed by: INTERNAL MEDICINE

## 2020-02-02 PROCEDURE — 96374 THER/PROPH/DIAG INJ IV PUSH: CPT

## 2020-02-02 PROCEDURE — 71045 X-RAY EXAM CHEST 1 VIEW: CPT

## 2020-02-02 PROCEDURE — 80307 DRUG TEST PRSMV CHEM ANLYZR: CPT

## 2020-02-02 PROCEDURE — 93458 L HRT ARTERY/VENTRICLE ANGIO: CPT | Performed by: INTERNAL MEDICINE

## 2020-02-02 PROCEDURE — 80076 HEPATIC FUNCTION PANEL: CPT

## 2020-02-02 PROCEDURE — 99285 EMERGENCY DEPT VISIT HI MDM: CPT

## 2020-02-02 PROCEDURE — 74011250636 HC RX REV CODE- 250/636: Performed by: INTERNAL MEDICINE

## 2020-02-02 PROCEDURE — 0100U RESPIRATORY PANEL,PCR,NASOPHARYNGEAL: CPT

## 2020-02-02 PROCEDURE — C1769 GUIDE WIRE: HCPCS | Performed by: INTERNAL MEDICINE

## 2020-02-02 PROCEDURE — C9113 INJ PANTOPRAZOLE SODIUM, VIA: HCPCS | Performed by: INTERNAL MEDICINE

## 2020-02-02 PROCEDURE — C1894 INTRO/SHEATH, NON-LASER: HCPCS | Performed by: INTERNAL MEDICINE

## 2020-02-02 PROCEDURE — 80074 ACUTE HEPATITIS PANEL: CPT

## 2020-02-02 PROCEDURE — 82550 ASSAY OF CK (CPK): CPT

## 2020-02-02 PROCEDURE — 65620000000 HC RM CCU GENERAL

## 2020-02-02 PROCEDURE — 36600 WITHDRAWAL OF ARTERIAL BLOOD: CPT

## 2020-02-02 PROCEDURE — B2151ZZ FLUOROSCOPY OF LEFT HEART USING LOW OSMOLAR CONTRAST: ICD-10-PCS | Performed by: INTERNAL MEDICINE

## 2020-02-02 PROCEDURE — 0BH17EZ INSERTION OF ENDOTRACHEAL AIRWAY INTO TRACHEA, VIA NATURAL OR ARTIFICIAL OPENING: ICD-10-PCS | Performed by: EMERGENCY MEDICINE

## 2020-02-02 PROCEDURE — 70450 CT HEAD/BRAIN W/O DYE: CPT

## 2020-02-02 PROCEDURE — 84484 ASSAY OF TROPONIN QUANT: CPT

## 2020-02-02 PROCEDURE — 77030005513 HC CATH URETH FOL11 MDII -B

## 2020-02-02 PROCEDURE — 94002 VENT MGMT INPAT INIT DAY: CPT

## 2020-02-02 PROCEDURE — B2111ZZ FLUOROSCOPY OF MULTIPLE CORONARY ARTERIES USING LOW OSMOLAR CONTRAST: ICD-10-PCS | Performed by: INTERNAL MEDICINE

## 2020-02-02 PROCEDURE — 81001 URINALYSIS AUTO W/SCOPE: CPT

## 2020-02-02 PROCEDURE — 85025 COMPLETE CBC W/AUTO DIFF WBC: CPT

## 2020-02-02 PROCEDURE — 85027 COMPLETE CBC AUTOMATED: CPT

## 2020-02-02 PROCEDURE — 74011000258 HC RX REV CODE- 258: Performed by: INTERNAL MEDICINE

## 2020-02-02 PROCEDURE — 93005 ELECTROCARDIOGRAM TRACING: CPT

## 2020-02-02 RX ORDER — VERAPAMIL HYDROCHLORIDE 2.5 MG/ML
INJECTION, SOLUTION INTRAVENOUS AS NEEDED
Status: DISCONTINUED | OUTPATIENT
Start: 2020-02-02 | End: 2020-02-02 | Stop reason: HOSPADM

## 2020-02-02 RX ORDER — PROPOFOL 10 MG/ML
0-50 VIAL (ML) INTRAVENOUS
Status: DISCONTINUED | OUTPATIENT
Start: 2020-02-02 | End: 2020-02-02

## 2020-02-02 RX ORDER — PROPOFOL 10 MG/ML
0-50 VIAL (ML) INTRAVENOUS
Status: DISCONTINUED | OUTPATIENT
Start: 2020-02-02 | End: 2020-02-03

## 2020-02-02 RX ORDER — HEPARIN SODIUM 200 [USP'U]/100ML
INJECTION, SOLUTION INTRAVENOUS
Status: COMPLETED | OUTPATIENT
Start: 2020-02-02 | End: 2020-02-02

## 2020-02-02 RX ORDER — ASPIRIN 300 MG/1
300 SUPPOSITORY RECTAL
Status: COMPLETED | OUTPATIENT
Start: 2020-02-02 | End: 2020-02-02

## 2020-02-02 RX ORDER — ENOXAPARIN SODIUM 100 MG/ML
40 INJECTION SUBCUTANEOUS EVERY 24 HOURS
Status: DISCONTINUED | OUTPATIENT
Start: 2020-02-02 | End: 2020-02-07 | Stop reason: HOSPADM

## 2020-02-02 RX ORDER — DIAZEPAM 10 MG/2ML
5 INJECTION INTRAMUSCULAR EVERY 8 HOURS
Status: DISCONTINUED | OUTPATIENT
Start: 2020-02-02 | End: 2020-02-05

## 2020-02-02 RX ORDER — LIDOCAINE HYDROCHLORIDE 10 MG/ML
INJECTION INFILTRATION; PERINEURAL AS NEEDED
Status: DISCONTINUED | OUTPATIENT
Start: 2020-02-02 | End: 2020-02-02 | Stop reason: HOSPADM

## 2020-02-02 RX ORDER — PROPOFOL 10 MG/ML
5 VIAL (ML) INTRAVENOUS
Status: DISCONTINUED | OUTPATIENT
Start: 2020-02-02 | End: 2020-02-02

## 2020-02-02 RX ORDER — HEPARIN SODIUM 1000 [USP'U]/ML
INJECTION, SOLUTION INTRAVENOUS; SUBCUTANEOUS AS NEEDED
Status: DISCONTINUED | OUTPATIENT
Start: 2020-02-02 | End: 2020-02-02 | Stop reason: HOSPADM

## 2020-02-02 RX ORDER — ROCURONIUM BROMIDE 10 MG/ML
80 INJECTION, SOLUTION INTRAVENOUS ONCE
Status: COMPLETED | OUTPATIENT
Start: 2020-02-02 | End: 2020-02-02

## 2020-02-02 RX ORDER — SODIUM CHLORIDE, SODIUM LACTATE, POTASSIUM CHLORIDE, CALCIUM CHLORIDE 600; 310; 30; 20 MG/100ML; MG/100ML; MG/100ML; MG/100ML
150 INJECTION, SOLUTION INTRAVENOUS CONTINUOUS
Status: DISPENSED | OUTPATIENT
Start: 2020-02-02 | End: 2020-02-03

## 2020-02-02 RX ORDER — ETOMIDATE 2 MG/ML
20 INJECTION INTRAVENOUS ONCE
Status: COMPLETED | OUTPATIENT
Start: 2020-02-02 | End: 2020-02-02

## 2020-02-02 RX ORDER — FENTANYL CITRATE 50 UG/ML
50-100 INJECTION, SOLUTION INTRAMUSCULAR; INTRAVENOUS
Status: DISCONTINUED | OUTPATIENT
Start: 2020-02-02 | End: 2020-02-03

## 2020-02-02 RX ORDER — SODIUM CHLORIDE 0.9 % (FLUSH) 0.9 %
5-40 SYRINGE (ML) INJECTION EVERY 8 HOURS
Status: DISCONTINUED | OUTPATIENT
Start: 2020-02-02 | End: 2020-02-07 | Stop reason: HOSPADM

## 2020-02-02 RX ORDER — MIDAZOLAM HYDROCHLORIDE 5 MG/ML
1-2 INJECTION INTRAMUSCULAR; INTRAVENOUS
Status: DISCONTINUED | OUTPATIENT
Start: 2020-02-02 | End: 2020-02-03

## 2020-02-02 RX ORDER — SODIUM CHLORIDE 0.9 % (FLUSH) 0.9 %
5-40 SYRINGE (ML) INJECTION AS NEEDED
Status: DISCONTINUED | OUTPATIENT
Start: 2020-02-02 | End: 2020-02-07 | Stop reason: HOSPADM

## 2020-02-02 RX ORDER — PROPOFOL 10 MG/ML
INJECTION, EMULSION INTRAVENOUS
Status: DISPENSED
Start: 2020-02-02 | End: 2020-02-02

## 2020-02-02 RX ADMIN — ENOXAPARIN SODIUM 40 MG: 40 INJECTION SUBCUTANEOUS at 21:40

## 2020-02-02 RX ADMIN — PROPOFOL 50 MCG/KG/MIN: 10 INJECTION, EMULSION INTRAVENOUS at 20:07

## 2020-02-02 RX ADMIN — DEXMEDETOMIDINE HYDROCHLORIDE 0.4 MCG/KG/HR: 100 INJECTION, SOLUTION, CONCENTRATE INTRAVENOUS at 21:40

## 2020-02-02 RX ADMIN — PROPOFOL 25 MCG/KG/MIN: 10 INJECTION, EMULSION INTRAVENOUS at 11:06

## 2020-02-02 RX ADMIN — SODIUM CHLORIDE, SODIUM LACTATE, POTASSIUM CHLORIDE, AND CALCIUM CHLORIDE 125 ML/HR: 600; 310; 30; 20 INJECTION, SOLUTION INTRAVENOUS at 15:40

## 2020-02-02 RX ADMIN — SODIUM CHLORIDE, SODIUM LACTATE, POTASSIUM CHLORIDE, AND CALCIUM CHLORIDE 1000 ML: 600; 310; 30; 20 INJECTION, SOLUTION INTRAVENOUS at 15:19

## 2020-02-02 RX ADMIN — Medication 10 ML: at 21:41

## 2020-02-02 RX ADMIN — ROCURONIUM BROMIDE 80 MG: 10 INJECTION, SOLUTION INTRAVENOUS at 10:56

## 2020-02-02 RX ADMIN — DIAZEPAM 5 MG: 5 INJECTION, SOLUTION INTRAMUSCULAR; INTRAVENOUS at 21:40

## 2020-02-02 RX ADMIN — ASPIRIN 300 MG: 300 SUPPOSITORY RECTAL at 12:16

## 2020-02-02 RX ADMIN — PROPOFOL 50 MCG/KG/MIN: 10 INJECTION, EMULSION INTRAVENOUS at 15:40

## 2020-02-02 RX ADMIN — SODIUM CHLORIDE 40 MG: 9 INJECTION INTRAMUSCULAR; INTRAVENOUS; SUBCUTANEOUS at 21:40

## 2020-02-02 RX ADMIN — ETOMIDATE 20 MG: 2 INJECTION INTRAVENOUS at 10:56

## 2020-02-02 NOTE — PROGRESS NOTES
Made follow-up visit for ongoing spiritual/emotional support of Mr Sunny Aguilar in [address] [facility] and his family. Nurses were attending to patient who had just arrived from Cath Lab. Proivided support to patient's mother, Jared, who was waiting in Cardiovascular waiting room. She shared that she was doing well without needs; she expressed great appreciation for support. : Rev. Angely Thompson.  Garrick Arauz; [facility], to contact [phone] [address] call: [phone]

## 2020-02-02 NOTE — Clinical Note
Right groin and right radial clipped, prepped with ChloraPrep and draped. Wet prep solution applied at: 1117. Wet prep solution dried at: 1122. Wet prep elapsed drying time: 5 mins.

## 2020-02-02 NOTE — PROGRESS NOTES
02/02/20 1115   Vent Settings   FIO2 (%) 60 %   CMV Rate Set 15   Back-Up Rate 15   Vt Set (ml) 500 ml   PEEP/VENT (cm H2O) 5 cm H20   Insp Flow (l/min) 50 l/min   Flow Trigger 3.0     Pt intubated and transported to cathlab. Pt placed on vent in cathlab.  Will continue to monitor

## 2020-02-02 NOTE — ED NOTES
Code STEMI called, patient brought back to room 8 and placed on the monitor, pacer pads placed on patient.  MF and cardiology at bedside a this time

## 2020-02-02 NOTE — PROGRESS NOTES
1140 TRANSFER - IN REPORT:    Verbal report received from Eliza(name) on Sean Brookie Aase  being received from Cath Lab(unit) for routine progression of care      Report consisted of patients Situation, Background, Assessment and   Recommendations(SBAR). Information from the following report(s) SBAR, Kardex, ED Summary, Procedure Summary, Intake/Output, MAR, Accordion and Recent Results was reviewed with the receiving nurse. Opportunity for questions and clarification was provided. Assessment completed upon patients arrival to unit and care assumed. 1157 Pt arrived to unit via bed. R radial site clean dry intact. 10cc in TR band.     Primary Nurse Guillermo Brown, MAITE and Maggi Valentine RN performed a dual skin assessment on this patient No impairment noted    Current Bed:     ALEX Bhat    Luis score is 21

## 2020-02-02 NOTE — PROGRESS NOTES
1241 Pt arrived on unit. Hooked up to monitor. TR band on right radial. 100cc of air present per cath lab. Pt on ventilator. 1300 Dr Kiara Pearson at bedside. Labs ordered. Family updated. 1316 Weaning propofol at this time to assess neuro status. 1359 Stopped propofol. Pt consistently gagging on tube. Able to follow commands but unable to stay calm at this time. Consistently trying to pull at tube. Notified Dr. Kiara Pearson. Orders to restart propofol gtt and US of abdomen today. 0 Mom and niece given code at bedside. Stated they would be back tomorrow. [address] at bedside.      1800 Labs sent per Dr. Kiara Pearson

## 2020-02-02 NOTE — ED PROVIDER NOTES
42 y.o. male with no significant past medical history who presents from home via personal vehicle brought in by his mother with chief complaint of aphasia. Mother brought the patients into the ED for evaluation after 2 days of increased sleeping, diaphoresis, weakness, and difficulty with his speech. Mother is unsure of exactly what drugs the patient took. Patients denies any pain. There are no other acute medical concerns at this time. Social hx: Current daily smoker. Illicit drug use. PCP: None    Full history, physical exam, and ROS unable to be obtained due to:  intoxication. Note written by wli Koroma, as dictated by Delma Rizvi MD 11:06 AM      The history is provided by a parent. The history is limited by the condition of the patient. No  was used. History reviewed. No pertinent past medical history. Past Surgical History:   Procedure Laterality Date    HX ORTHOPAEDIC      Right ankle sugery         History reviewed. No pertinent family history.     Social History     Socioeconomic History    Marital status: SINGLE     Spouse name: Not on file    Number of children: Not on file    Years of education: Not on file    Highest education level: Not on file   Occupational History    Not on file   Social Needs    Financial resource strain: Not on file    Food insecurity:     Worry: Not on file     Inability: Not on file    Transportation needs:     Medical: Not on file     Non-medical: Not on file   Tobacco Use    Smoking status: Current Every Day Smoker   Substance and Sexual Activity    Alcohol use: Yes     Comment: social    Drug use: Not on file    Sexual activity: Not on file   Lifestyle    Physical activity:     Days per week: Not on file     Minutes per session: Not on file    Stress: Not on file   Relationships    Social connections:     Talks on phone: Not on file     Gets together: Not on file     Attends Christianity service: Not on file     Active member of club or organization: Not on file     Attends meetings of clubs or organizations: Not on file     Relationship status: Not on file    Intimate partner violence:     Fear of current or ex partner: Not on file     Emotionally abused: Not on file     Physically abused: Not on file     Forced sexual activity: Not on file   Other Topics Concern    Not on file   Social History Narrative    Not on file         ALLERGIES: Patient has no known allergies. Review of Systems   Unable to perform ROS: Mental status change       Vitals:    02/02/20 0846 02/02/20 1040   BP: 107/75 112/82   Pulse: 91    Resp: 16    Temp: 98.9 °F (37.2 °C)    SpO2: 96%             Physical Exam  Constitutional:       Appearance: He is well-developed. HENT:      Head: Normocephalic. Nose: Nose normal.   Eyes:      Conjunctiva/sclera: Conjunctivae normal.   Neck:      Musculoskeletal: Normal range of motion and neck supple. Cardiovascular:      Rate and Rhythm: Normal rate and regular rhythm. Heart sounds: Normal heart sounds. Pulmonary:      Effort: Pulmonary effort is normal. No respiratory distress. Breath sounds: Normal breath sounds. Abdominal:      General: There is no distension. Palpations: Abdomen is soft. Musculoskeletal: Normal range of motion. General: No deformity. Skin:     General: Skin is warm and dry. Neurological:      Coordination: Coordination normal.      Comments: Awake. Unable to answer questions appropriately. Psychiatric:         Behavior: Behavior normal.          MDM  Number of Diagnoses or Management Options  Altered mental status, unspecified altered mental status type:   Drug intoxication with complication [facility]):   ST elevation myocardial infarction (STEMI) of anterior wall [facility]):   Diagnosis management comments: Pt with drug intoxication per mother at bedside. Pt smiling, responds to verbal stimuli but does not answer questions consistently. Denies pain to me. STEMI on EKG and discussed with cardiologist. In setting of elevated CTNI, plan for cath given pain and recent cocaine use. Pt unable to consent for procedure and would not tolerate. Intubated for cath and ICU admission after. Mother verbally consented. Amount and/or Complexity of Data Reviewed  Discussion of test results with the performing providers: yes  Decide to obtain previous medical records or to obtain history from someone other than the patient: yes  Obtain history from someone other than the patient: yes  Review and summarize past medical records: yes  Discuss the patient with other providers: yes  Independent visualization of images, tracings, or specimens: yes    Critical Care  Total time providing critical care: 30-74 minutes    Patient Progress  Patient progress: stable    ED Course as of Feb 02 1112   Sun Feb 02, 2020   1104 Discussion with cardiologist who will take pt to cath lab for EVIE on EKG and positive troponin. Pt intoxicated, unable to give consent. Denying pain to me. Mother gave consent for procedure to cardiologist who requested intubation prior to taking him to cath lab. Intubated and transferred to cath lab. Sign out given to intensivist.     [AL]      ED Course User Index  [AL] Jayjay Benites MD       Intubation  Date/Time: 2/2/2020 10:51 AM  Performed by: Jayjay Benites MD  Authorized by: Jayjay Benites MD     Consent:     Consent obtained:  Verbal (Dr. Callie Saenz consented)    Consent given by:  Parent  Pre-procedure details:     Patient status:  Altered mental status  Procedure details:     Preoxygenation:  Nasal cannula    CPR in progress: no      Intubation method:  Oral    Laryngoscope blade: Mac 3    Tube size (mm):  7.5    Tube type:  Cuffed    Number of attempts:  1  Placement assessment:     ETT to teeth:  23    Placement verification: chest rise and CXR verification    Post-procedure details:     Patient tolerance of procedure:   Tolerated well, no immediate complications        PROGRESS NOTE:  10:43 AM  Dr. Leonard Garcia, cardiologist, will be taking the pt to the cath lab and would like him to be intubated for the cath lab. PROGRESS NOTE:  11:15 AM  CCU attending Dr. Chantal Kemp is aware of patient's arrival.        ED EKG interpretation:  Rhythm: normal sinus rhythm; and regular . Rate (approx.): 85; Axis: normal; ST elevation in V2 and V3. STEMI. Patient is being admitted to the hospital.  The results of their tests and reasons for their admission have been discussed with them and/or available family. They convey agreement and understanding for the need to be admitted and for their admission diagnosis.

## 2020-02-02 NOTE — ED NOTES
1044: Dr. Gasper Henderson preparing for intubation at this time     [phone]: MD and cardiologist informed of patients troponin level     1100: 8.0 et tube 23 at the teeth

## 2020-02-02 NOTE — PROGRESS NOTES
1110-       Cardiac Cath Lab Procedure Area Arrival Note:    Lincoln Castrejon arrived to Cardiac Cath Lab, Procedure Area. Patient identifiers verified with NAME and DATE OF BIRTH. Unable to verify with patient. Patient on cardiac monitor, non-invasive blood pressure, SPO2 monitor. Intubated and sedated on AC- 15, TV_ 500, 60, 5. IV of NS on pump at 100 ml/hr. Patient status Intubated and sedated. Patient medicated during procedure with orders obtained and verified by Dr. Marycarmen Mack. Refer to patients Cardiac Cath Lab PROCEDURE REPORT for vital signs, assessment, status, and response during procedure, printed at end of case. Printed report on chart or scanned into chart. 1145-    TRANSFER - OUT REPORT:    Verbal report given to TIARA RN (name) on Lincoln Castrejon  being transferred to CCU (unit) for routine progression of care       Report consisted of patients Situation, Background, Assessment and   Recommendations(SBAR). Information from the following report(s) Procedure Summary and MAR was reviewed with the receiving nurse. Lines:   Peripheral IV 02/02/20 Left Antecubital (Active)   Site Assessment Clean, dry, & intact 2/2/2020 10:39 AM   Phlebitis Assessment 0 2/2/2020 10:39 AM   Infiltration Assessment 0 2/2/2020 10:39 AM   Dressing Status Clean, dry, & intact 2/2/2020 10:39 AM       Peripheral IV 02/02/20 Right Antecubital (Active)   Site Assessment Clean, dry, & intact 2/2/2020 10:41 AM   Phlebitis Assessment 0 2/2/2020 10:41 AM   Infiltration Assessment 0 2/2/2020 10:41 AM   Dressing Status Clean, dry, & intact 2/2/2020 10:41 AM        Opportunity for questions and clarification was provided.       Patient transported with:   O2 @ 15 liters  Registered Nurse  [facility]

## 2020-02-02 NOTE — PROGRESS NOTES
EKG : consistent with Brugada's pattern     Avoid fevers/ [email or web address]  ( to check list of meds to avoid)  No fu edge indicated  unless fmhx of scd or syncope    Cath:  No CAD, elevated LVEDP,. EF 50%

## 2020-02-02 NOTE — PROGRESS NOTES
Pt transported to CCU from cathlab with ambu bag/mask. Pt placed back on vent in CCU.  ETT remains patent

## 2020-02-02 NOTE — PROGRESS NOTES
Brief Procedure Note    Patient: Andres Rushing MRN: [id number]  SSN: [id number]    YOB: 1980  Age: 42 y.o. Sex: male      Date of Procedure: 2/2/2020     Pre-procedure Diagnosis: NSTEMI, abn EKG    Post-procedure Diagnosis: No CAD, elevated LVEDP,. EF 50%    Procedure: ultrasound-guided vascular access, LHC, cors, LVg    Performed By: Marylou Hernandez III, DO     Anesthesia: Moderate Sedation    Estimated Blood Loss: Less than 10 mL      Specimens:  None    Findings: as above    Complications: None    Implants: None    Recommendations: Continue medical therapy.     Signed By: Marylou Hernandez III, DO     February 2, 2020

## 2020-02-02 NOTE — PROGRESS NOTES
Spiritual Care Assessment/Progress Note  [facility]      NAME: Polina Taylor      MRN: [id number]  AGE: 42 y.o. SEX: male  Alevism Affiliation: No Nondenominational   Language: English     2/2/2020           Spiritual Assessment begun in [facility] through conversation with:         []Patient        [x] Family    [] Friend(s)        Reason for Consult: Crisis, Stemi     Spiritual beliefs: (Please include comment if needed)     [] Identifies with a cheryl tradition:         [] Supported by a cheryl community:            [] Claims no spiritual orientation:           [] Seeking spiritual identity:                [x] Adheres to an individual form of spirituality:           [] Not able to assess:                           Identified resources for coping:      [x] Prayer                               [] Music                  [] Guided Imagery     [x] Family/friends                 [] Pet visits     [] Devotional reading                         [] Unknown     [] Other:                                               Interventions offered during this visit: (See comments for more details)    Patient Interventions: Crisis, Stemi, Initial/Spiritual assessment, Critical care     Family/Friend(s):  Affirmation of emotions/emotional suffering, Catharsis/review of pertinent events in supportive environment, Initial Assessment, Prayer (assurance of)     Plan of Care:     [x] Support spiritual and/or cultural needs    [] Support AMD and/or advance care planning process      [] Support grieving process   [] Coordinate Rites and/or Rituals    [] Coordination with community clergy   [] No spiritual needs identified at this time   [] Detailed Plan of Care below (See Comments)  [] Make referral to Music Therapy  [] Make referral to Pet Therapy     [] Make referral to Addiction services  [] Make referral to [facility]  [] Make referral to Spiritual Care Partner  [] No future visits requested        [x] Follow up visits as needed     Comments: Responded to Code Stemi called for Mr Haylee Yanez in ED-08. Multiple staff members were attending to patient. Provided active listening to patient's mother as she shared about the events that led up to patient's ED admission. Provided emotional support as she attempted to process her feelings of fear and concern; acknowledged her feelings and provided words of support. Continued to provide support as physician updated mother, Jared, on patient's condition and the need for a Cardiac Cath. Provided Jared with Coke and peanut butter crackers as she shared she had had nothing to eat this a.m. Accompanied patient's mother to Cardiac Cath waiting room and instructed her on how to use the land-land to contact her family members. She shared that her [facility] to be with her and she had no other needs at that time.  had to leave to attend another emergency. Assured her of prayers on their behalf and of ongoing  availability for support. : Rev. Radhames Fernandez.  Renetta Estrella; [facility], to contact [phone] Prabhu Young call: [facility]

## 2020-02-02 NOTE — H&P
[facility]    ICU Team H&P Note    Name: Jacy Saleh   : 1980   MRN: [id number]   Date: 2020      Subjective:   H&P Note: 2020      Patient is asked to be seen by Dr. Marcela Strickland, for concern for STEMI necessitating the need for possible ICU care. Reason for ICU Admission: Suspected STEMI s/p L heart Cath     HPI: Patient is a 39M with no significant PMHx other than recreational polysubstance use presented to the ED on  for altered mental status. Patient was brought in by his mother, noted that starting  evening patient was more somnolent, per patient's mother, he reported using some type of recreational substance, his mother described him to be very somnolent and difficult to arouse. Patient was in similar condition on . He was not able to eat. Patient also c/o abdominal pain. He presented to the ED on  with acute encephalopathy, ED physician described him as being under the influence of some type of substance, inappropriately laughing, not following commands. Patient c/o Chest pain in the ED and back pain at the same time, he was taken to [address] [place] [address] by Dr. Marcela Strickland, no CAD, no evidence of HF, EF 50%. Patient was brought to CCU for continued management. It was noted that patient has Hyperkalemia of 6.3, improved to 5.8, will continue trending BMP Q6h. Also noted to have elevated LFTs, will repeat labs, if improving, will consider extubating the patient. POD:Day of Surgery    S/P: Procedure(s):  LEFT HEART CATH  CORONARY ANGIOGRAPHY    Active Problem List:     Problem List  Never Reviewed          Codes Class    Altered mental status ICD-10-CM: R41.82  ICD-9-CM: 780.97               Past Medical History:      has no past medical history on file. Past Surgical History:      has a past surgical history that includes hx orthopaedic. Home Medications:     Prior to Admission medications    Medication Sig Start Date End Date Taking?  Authorizing Provider ondansetron (ZOFRAN ODT) 4 mg disintegrating tablet Take 1 Tab by mouth every eight (8) hours as needed for Nausea. 3/21/18   Meghan Gavin NP       Allergies/Social/Family History:     No Known Allergies   Social History     Tobacco Use    Smoking status: Current Every Day Smoker   Substance Use Topics    Alcohol use: Yes     Comment: social      History reviewed. No pertinent family history. Review of Systems:     unable to obtain due to patient condition    Objective:   Vital Signs:  Visit Vitals  /87   Pulse 79   Temp 98.9 °F (37.2 °C)   Resp 17   Wt 77.4 kg (170 lb 10.2 oz)   SpO2 100%   BMI 23.14 kg/m²      O2 Device: Endotracheal tube Temp (24hrs), Av.9 °F (37.2 °C), Min:98.9 °F (37.2 °C), Max:98.9 °F (37.2 °C)         Intake/Output:     Intake/Output Summary (Last 24 hours) at 2020 1438  Last data filed at 2020 1430  Gross per 24 hour   Intake 18.37 ml   Output 1175 ml   Net -1156.63 ml       Physical Exam:    General:  Sedated and on the ventilator. No acute distress. Eyes:  Sclera anicteric. Pupils pinpoint   Mouth/Throat: Orotracheal tube in place. Neck: Supple, trachea midline   Lungs:   Clear to auscultation bilaterally, good effort. Cardiovascular:  Rr, S1, S2   Abdomen:   Soft, non-tender. Extremities: No cyanosis or edema. Skin: No acute rash or lesions. Musculoskeletal:  No swelling or deformity. Lines/Devices:  Intact, no erythema, drainage, or tenderness. Neuro: Sedated and appears comfortable on ventilator.          LABS AND  DATA: Personally reviewed  Recent Labs     20  1249 20  1036   WBC 15.7* 17.2*   HGB 15.2 16.7   HCT 46.8 50.7*   * 178     Recent Labs     20  1249 20  1036   * 130*   K 5.8* 6.3*   CL 97 97   CO2 26 29   BUN 31* 34*   CREA 2.37* 2.77*   GLU 94 104*   CA 7.8* 8.7     Recent Labs     20  1249 20  1036   SGOT >2,000* >2,000*   AP 92 111   TP 7.4 8.8*   ALB 3.8 4.7   GLOB 3.6 4.1* Recent Labs     02/02/20  1249   INR 1.4*   PTP 14.4*      Recent Labs     02/02/20  1347   PHI 7.367   PCO2I 48.3*   PO2I 103*   FIO2I 0.40     No results for input(s): CPK, CKMB, TROIQ, BNPP in the last 72 hours. Hemodynamics:   PAP:   CO:     Wedge:   CI:     CVP:    SVR:       PVR:       Ventilator Settings:  Mode Rate Tidal Volume Pressure FiO2 PEEP   Assist control   500 ml    40 % 5 cm H20     Peak airway pressure: 29 cm H2O    Minute ventilation: 8.1 l/min        MEDS: Reviewed    Chest X-Ray: personally reviewed and report checked  US ABD COMP   Final Result   IMPRESSION: No cholelithiasis or evidence of acute cholecystitis. XR CHEST PORT   Final Result   IMPRESSION: ET tube in appropriate position. Clear lungs. XR CHEST PORT   Final Result   IMPRESSION: No acute cardiopulmonary disease. CT HEAD WO CONT   Final Result   IMPRESSION: No acute intracranial hemorrhage, mass or infarct. XR CHEST PORT    (Results Pending)         Assessment:     ICU Problems:  - Acute encephalopathy  - Polysubstance abuse  - Hyperkalemia  - NOLAN  - elevated LFTs  - Leukocytosis  - Hyponatremia     ICU Comprehensive Plan of Care:   Plans for this Shift:  1. Obtain acute hepatitis panel  2. Trend LFTs  3. Give additional 1L LR bolus, start on 125cc/hr LR  4. Trend BMP for Hyperkalemia  5. Wean sedation, monitor mental status  6. If patient improves, will consider extubation   7. Trend CK  8. Monitor UOP  9. Unclear reason for elevated liver enzymes, will continue to trend, obtain Abd Ultrasound to evaluate for portal vein thrombosis/acute hepatitis   10. If electrolytes does not improve or UOP is insufficient will consult nephrology     Multidisciplinary Rounds Completed:   Yes    ABCDEF Bundle/Checklist  Pain Medications: None  Target RASS: -2 - Light Sedation - Briefly awakens to voice (eyes open & contact <10 sec)  Sedation Medications: Propofol  CAM-ICU:  unable to evaluate  Mobility: Bedrest  PT/OT: Not ready   Restraints: Soft wrist restraints  Discussed Plan of Care (goals of care): Yes  Addressed Code Status: Full Code    CARDIOVASCULAR  Cardiac Gtts: None  SBP Goal of: > 90 mmHg  MAP Goal of: > 65 mmHg  Transfusion Trigger (Hgb): <7 g/dL    RESPIRATORY  Vent Goals:   Chlorhexidine   Optimize PEEP/Ventilation/Oxygenation  Goal Tidal Volume 6 cc/kg based on IBW  Aim for lung protective ventilation  Head of bed > 30 degrees  DVT Prophylaxis (if no, list reason): SCD's or Sequential Compression Device and Heparin   SPO2 Goal: > 92%  Pulmonary toilet: in-line suction     GI/  Castro Catheter Present: Yes  GI Prophylaxis: Pepcid (famotidine)   Nutrition: No   IVFs: LR 125cc/hr  Bowel Movement: N/A  Bowel Regimen: None needed at this time  Insulin: ISS    ANTIBIOTICS  Antibiotics:  None    T/L/D  Tubes: ETT  Lines: Peripheral IV  Drains: Castro Catheter    SPECIAL EQUIPMENT  None    DISPOSITION  Stay in ICU    CRITICAL CARE CONSULTANT NOTE  I had a face to face encounter with the patient, reviewed and interpreted patient data including clinical events, labs, images, vital signs, I/O's, and examined patient. I have discussed the case and the plan and management of the patient's care with the consulting services, the bedside nurses and the respiratory therapist.      NOTE OF PERSONAL INVOLVEMENT IN CARE   This patient has a high probability of imminent, clinically significant deterioration, which requires the highest level of preparedness to intervene urgently. I participated in the decision-making and personally managed or directed the management of the following life and organ supporting interventions that required my frequent assessment to treat or prevent imminent deterioration. I personally spent 55 minutes of critical care time.   This is time spent at this critically ill patient's bedside actively involved in patient care as well as the coordination of care and discussions with the patient's family. This does not include any procedural time which has been billed separately.     Hieu Augustine MD  Staff [address]  2/2/2020

## 2020-02-02 NOTE — ED TRIAGE NOTES
Per Mother, Patient fell out of bed Friday night. \"Legs twisted and lots of sweating, dead weight. I got him back to bed and we went to bed. He was in bed all day yesterday. He hasn't walked normal since. Holding on to the wall. There's a lot of sleeping. He's not talking right. \" Mother concerned about patient taking drugs. Patient denies talking drugs.  Alert to self. +dysphasia & confusion

## 2020-02-02 NOTE — PROGRESS NOTES
2/2/2020      RE: Marlee Han      To Whom it May Concern:      Shawn North is currently at [facility] as of February 2, 2020 due to illness. He will be hospitalized for an undetermined amount of time. Please feel free to contact my office if you have any questions or concerns [phone]. Thank you for your assistance in this matter.     Sincerely,      Dominga Nichols RN

## 2020-02-02 NOTE — Clinical Note
Patient arrives to cath lab accompanied by RN, RCIS, respiratory, and Dr. Julianne Muñoz. Patient is intubated and sedated and exhibiting no signs of acute distress.

## 2020-02-03 ENCOUNTER — APPOINTMENT (OUTPATIENT)
Dept: GENERAL RADIOLOGY | Age: 40
DRG: 351 | End: 2020-02-03
Attending: INTERNAL MEDICINE
Payer: MEDICAID

## 2020-02-03 LAB
ALBUMIN SERPL-MCNC: 3.3 G/DL (ref 3.5–5)
ALBUMIN/GLOB SERPL: 1.1 {RATIO} (ref 1.1–2.2)
ALP SERPL-CCNC: 79 U/L (ref 45–117)
ALT SERPL-CCNC: 1484 U/L (ref 12–78)
AMMONIA PLAS-SCNC: 32 UMOL/L
ANION GAP SERPL CALC-SCNC: 4 MMOL/L (ref 5–15)
ARTERIAL PATENCY WRIST A: YES
AST SERPL-CCNC: >2000 U/L (ref 15–37)
BASE EXCESS BLD CALC-SCNC: 4 MMOL/L
BASOPHILS # BLD: 0 K/UL (ref 0–0.1)
BASOPHILS NFR BLD: 0 % (ref 0–1)
BDY SITE: ABNORMAL
BILIRUB SERPL-MCNC: 0.5 MG/DL (ref 0.2–1)
BUN SERPL-MCNC: 25 MG/DL (ref 6–20)
BUN/CREAT SERPL: 16 (ref 12–20)
CA-I BLD-SCNC: 1.23 MMOL/L (ref 1.12–1.32)
CALCIUM SERPL-MCNC: 8.6 MG/DL (ref 8.5–10.1)
CHLORIDE SERPL-SCNC: 104 MMOL/L (ref 97–108)
CK SERPL-CCNC: ABNORMAL U/L (ref 39–308)
CK SERPL-CCNC: ABNORMAL U/L (ref 39–308)
CO2 SERPL-SCNC: 27 MMOL/L (ref 21–32)
CREAT SERPL-MCNC: 1.55 MG/DL (ref 0.7–1.3)
DIFFERENTIAL METHOD BLD: ABNORMAL
EOSINOPHIL # BLD: 0.1 K/UL (ref 0–0.4)
EOSINOPHIL NFR BLD: 1 % (ref 0–7)
ERYTHROCYTE [DISTWIDTH] IN BLOOD BY AUTOMATED COUNT: 14.4 % (ref 11.5–14.5)
GAS FLOW.O2 O2 DELIVERY SYS: ABNORMAL L/MIN
GAS FLOW.O2 SETTING OXYMISER: 17 BPM
GLOBULIN SER CALC-MCNC: 2.9 G/DL (ref 2–4)
GLUCOSE SERPL-MCNC: 97 MG/DL (ref 65–100)
HCO3 BLD-SCNC: 28.9 MMOL/L (ref 22–26)
HCT VFR BLD AUTO: 40.3 % (ref 36.6–50.3)
HGB BLD-MCNC: 13.7 G/DL (ref 12.1–17)
IMM GRANULOCYTES # BLD AUTO: 0 K/UL (ref 0–0.04)
IMM GRANULOCYTES NFR BLD AUTO: 0 % (ref 0–0.5)
LYMPHOCYTES # BLD: 1.8 K/UL (ref 0.8–3.5)
LYMPHOCYTES NFR BLD: 18 % (ref 12–49)
MAGNESIUM SERPL-MCNC: 1.7 MG/DL (ref 1.6–2.4)
MCH RBC QN AUTO: 28.8 PG (ref 26–34)
MCHC RBC AUTO-ENTMCNC: 34 G/DL (ref 30–36.5)
MCV RBC AUTO: 84.7 FL (ref 80–99)
MONOCYTES # BLD: 0.5 K/UL (ref 0–1)
MONOCYTES NFR BLD: 5 % (ref 5–13)
NEUTS SEG # BLD: 7.5 K/UL (ref 1.8–8)
NEUTS SEG NFR BLD: 76 % (ref 32–75)
NRBC # BLD: 0.02 K/UL (ref 0–0.01)
NRBC BLD-RTO: 0.2 PER 100 WBC
O2/TOTAL GAS SETTING VFR VENT: 40 %
PCO2 BLD: 50.5 MMHG (ref 35–45)
PEEP RESPIRATORY: 5 CMH2O
PH BLD: 7.37 [PH] (ref 7.35–7.45)
PHOSPHATE SERPL-MCNC: 1.5 MG/DL (ref 2.6–4.7)
PLATELET # BLD AUTO: 147 K/UL (ref 150–400)
PMV BLD AUTO: 10.6 FL (ref 8.9–12.9)
PO2 BLD: 120 MMHG (ref 80–100)
POTASSIUM SERPL-SCNC: 4.3 MMOL/L (ref 3.5–5.1)
PROT SERPL-MCNC: 6.2 G/DL (ref 6.4–8.2)
RBC # BLD AUTO: 4.76 M/UL (ref 4.1–5.7)
SAO2 % BLD: 98 % (ref 92–97)
SODIUM SERPL-SCNC: 135 MMOL/L (ref 136–145)
SPECIMEN TYPE: ABNORMAL
TOTAL RESP. RATE, ITRR: 17
VENTILATION MODE VENT: ABNORMAL
VT SETTING VENT: 500 ML
WBC # BLD AUTO: 10 K/UL (ref 4.1–11.1)

## 2020-02-03 PROCEDURE — 82803 BLOOD GASES ANY COMBINATION: CPT

## 2020-02-03 PROCEDURE — 74011000258 HC RX REV CODE- 258: Performed by: INTERNAL MEDICINE

## 2020-02-03 PROCEDURE — 71045 X-RAY EXAM CHEST 1 VIEW: CPT

## 2020-02-03 PROCEDURE — 74011000250 HC RX REV CODE- 250: Performed by: INTERNAL MEDICINE

## 2020-02-03 PROCEDURE — 84100 ASSAY OF PHOSPHORUS: CPT

## 2020-02-03 PROCEDURE — 80053 COMPREHEN METABOLIC PANEL: CPT

## 2020-02-03 PROCEDURE — 94003 VENT MGMT INPAT SUBQ DAY: CPT

## 2020-02-03 PROCEDURE — 36415 COLL VENOUS BLD VENIPUNCTURE: CPT

## 2020-02-03 PROCEDURE — 85025 COMPLETE CBC W/AUTO DIFF WBC: CPT

## 2020-02-03 PROCEDURE — 36600 WITHDRAWAL OF ARTERIAL BLOOD: CPT

## 2020-02-03 PROCEDURE — 74011250636 HC RX REV CODE- 250/636: Performed by: INTERNAL MEDICINE

## 2020-02-03 PROCEDURE — 82550 ASSAY OF CK (CPK): CPT

## 2020-02-03 PROCEDURE — 83735 ASSAY OF MAGNESIUM: CPT

## 2020-02-03 PROCEDURE — 82140 ASSAY OF AMMONIA: CPT

## 2020-02-03 PROCEDURE — 74011250636 HC RX REV CODE- 250/636: Performed by: EMERGENCY MEDICINE

## 2020-02-03 PROCEDURE — 74011250637 HC RX REV CODE- 250/637: Performed by: INTERNAL MEDICINE

## 2020-02-03 PROCEDURE — 65620000000 HC RM CCU GENERAL

## 2020-02-03 RX ORDER — LORAZEPAM 1 MG/1
2 TABLET ORAL
Status: DISCONTINUED | OUTPATIENT
Start: 2020-02-03 | End: 2020-02-04

## 2020-02-03 RX ORDER — LORAZEPAM 1 MG/1
4 TABLET ORAL
Status: DISCONTINUED | OUTPATIENT
Start: 2020-02-03 | End: 2020-02-04

## 2020-02-03 RX ORDER — FAMOTIDINE 20 MG/1
20 TABLET, FILM COATED ORAL 2 TIMES DAILY
Status: DISCONTINUED | OUTPATIENT
Start: 2020-02-03 | End: 2020-02-03

## 2020-02-03 RX ORDER — LANOLIN ALCOHOL/MO/W.PET/CERES
100 CREAM (GRAM) TOPICAL DAILY
Status: DISCONTINUED | OUTPATIENT
Start: 2020-02-04 | End: 2020-02-07 | Stop reason: HOSPADM

## 2020-02-03 RX ORDER — FOLIC ACID 1 MG/1
1 TABLET ORAL DAILY
Status: DISCONTINUED | OUTPATIENT
Start: 2020-02-04 | End: 2020-02-04

## 2020-02-03 RX ORDER — MAGNESIUM SULFATE 1 G/100ML
1 INJECTION INTRAVENOUS ONCE
Status: COMPLETED | OUTPATIENT
Start: 2020-02-03 | End: 2020-02-03

## 2020-02-03 RX ADMIN — PROPOFOL 50 MCG/KG/MIN: 10 INJECTION, EMULSION INTRAVENOUS at 00:05

## 2020-02-03 RX ADMIN — MAGNESIUM SULFATE HEPTAHYDRATE 1 G: 1 INJECTION, SOLUTION INTRAVENOUS at 05:58

## 2020-02-03 RX ADMIN — SODIUM PHOSPHATE, MONOBASIC, MONOHYDRATE: 276; 142 INJECTION, SOLUTION INTRAVENOUS at 09:23

## 2020-02-03 RX ADMIN — SODIUM CHLORIDE, SODIUM LACTATE, POTASSIUM CHLORIDE, AND CALCIUM CHLORIDE 125 ML/HR: 600; 310; 30; 20 INJECTION, SOLUTION INTRAVENOUS at 00:06

## 2020-02-03 RX ADMIN — DIAZEPAM 5 MG: 5 INJECTION, SOLUTION INTRAMUSCULAR; INTRAVENOUS at 05:31

## 2020-02-03 RX ADMIN — Medication 10 ML: at 05:31

## 2020-02-03 RX ADMIN — ENOXAPARIN SODIUM 40 MG: 40 INJECTION SUBCUTANEOUS at 21:45

## 2020-02-03 RX ADMIN — Medication 10 ML: at 21:45

## 2020-02-03 RX ADMIN — LORAZEPAM 2 MG: 1 TABLET ORAL at 16:46

## 2020-02-03 RX ADMIN — FENTANYL CITRATE 100 MCG: 50 INJECTION, SOLUTION INTRAMUSCULAR; INTRAVENOUS at 14:05

## 2020-02-03 RX ADMIN — DIAZEPAM 5 MG: 5 INJECTION, SOLUTION INTRAMUSCULAR; INTRAVENOUS at 21:45

## 2020-02-03 RX ADMIN — LORAZEPAM 2 MG: 1 TABLET ORAL at 16:32

## 2020-02-03 RX ADMIN — Medication 10 ML: at 13:08

## 2020-02-03 RX ADMIN — DIAZEPAM 5 MG: 5 INJECTION, SOLUTION INTRAMUSCULAR; INTRAVENOUS at 13:07

## 2020-02-03 RX ADMIN — PROPOFOL 50 MCG/KG/MIN: 10 INJECTION, EMULSION INTRAVENOUS at 03:40

## 2020-02-03 RX ADMIN — DEXMEDETOMIDINE HYDROCHLORIDE 0.5 MCG/KG/HR: 100 INJECTION, SOLUTION, CONCENTRATE INTRAVENOUS at 14:16

## 2020-02-03 NOTE — INTERDISCIPLINARY ROUNDS
IDR/SLIDR Summary          Patient: Cierra Edouard MRN: [id number]    Age: 42 y.o. YOB: 1980 Room/Bed: [address] [place] [address]   Admit Diagnosis: Altered mental status [R41.82]  Principal Diagnosis: <principal problem not specified>   Goals: SAT/SBT? Readmission: NO  Quality Measure: SCIP  VTE Prophylaxis: Chemical  Influenza Vaccine screening completed? YES  Pneumococcal Vaccine screening completed? NO  Mobility needs: Yes   Nutrition plan:Yes  Consults:Speech, Respiratory and Case Management    Financial concerns:Yes  Escalated to CM? YES  RRAT Score: 13   Interventions:H2H  Testing due for pt today? YES  LOS: 0 days Expected length of stay ?  days  Discharge plan: tbd   PCP: None  Transportation needs: Yes    Days before discharge:two or more days before discharge   Discharge disposition: Home    Signed:     Levi Scruggs RN  2/2/2020  10:24 PM

## 2020-02-03 NOTE — PROGRESS NOTES
Problem: Ventilator Management  Goal: *Adequate oxygenation and ventilation  Outcome: Progressing Towards Goal  Goal: *Patient maintains clear airway/free of aspiration  Outcome: Progressing Towards Goal  Goal: *Absence of infection signs and symptoms  Outcome: Progressing Towards Goal  Goal: *Normal spontaneous ventilation  Outcome: Progressing Towards Goal     Problem: Patient Education: Go to Patient Education Activity  Goal: Patient/Family Education  Outcome: Progressing Towards Goal     Problem: Non-Violent Restraints  Goal: *Removal from restraints as soon as assessed to be safe  Outcome: Progressing Towards Goal  Goal: *No harm/injury to patient while restraints in use  Outcome: Progressing Towards Goal  Goal: *Patient's dignity will be maintained  Outcome: Progressing Towards Goal  Goal: *Patient Specific Goal (EDIT GOAL, INSERT TEXT)  Outcome: Progressing Towards Goal  Goal: Non-violent Restaints:Standard Interventions  Outcome: Progressing Towards Goal  Goal: Non-violent Restraints:Patient Interventions  Outcome: Progressing Towards Goal  Goal: Patient/Family Education  Outcome: Progressing Towards Goal     Problem: Pressure Injury - Risk of  Goal: *Prevention of pressure injury  Description  Document Luis Scale and appropriate interventions in the flowsheet. Outcome: Progressing Towards Goal  Note: Pressure Injury Interventions:  Sensory Interventions: Assess changes in LOC, Assess need for specialty bed, Avoid rigorous massage over bony prominences, Check visual cues for pain, Float heels, Keep linens dry and wrinkle-free, Maintain/enhance activity level, Minimize linen layers, Monitor skin under medical devices, Pad between skin to skin, Pressure redistribution bed/mattress (bed type), Turn and reposition approx.  every two hours (pillows and wedges if needed)         Activity Interventions: Pressure redistribution bed/mattress(bed type)    Mobility Interventions: Float heels, HOB 30 degrees or less, Pressure redistribution bed/mattress (bed type), Turn and reposition approx. every two hours(pillow and wedges)    Nutrition Interventions: Document food/fluid/supplement intake    Friction and Shear Interventions: Apply protective barrier, creams and emollients, HOB 30 degrees or less, Lift sheet, Minimize layers, Transferring/repositioning devices                Problem: Patient Education: Go to Patient Education Activity  Goal: Patient/Family Education  Outcome: Progressing Towards Goal     Problem: Falls - Risk of  Goal: *Absence of Falls  Description  Document Tia Manus Fall Risk and appropriate interventions in the flowsheet.   Outcome: Progressing Towards Goal  Note: Fall Risk Interventions:       Mentation Interventions: Adequate sleep, hydration, pain control, Door open when patient unattended, Evaluate medications/consider consulting pharmacy, More frequent rounding, Room close to nurse's station, Toileting rounds    Medication Interventions: Evaluate medications/consider consulting pharmacy    Elimination Interventions: Call light in reach, Patient to call for help with toileting needs, Toileting schedule/hourly rounds              Problem: Patient Education: Go to Patient Education Activity  Goal: Patient/Family Education  Outcome: Progressing Towards Goal     Problem: Infection - Risk of, Urinary Catheter-Associated Urinary Tract Infection  Goal: *Absence of infection signs and symptoms  Outcome: Progressing Towards Goal     Problem: Patient Education: Go to Patient Education Activity  Goal: Patient/Family Education  Outcome: Progressing Towards Goal     Problem: Patient Education: Go to Patient Education Activity  Goal: Patient/Family Education  Outcome: Progressing Towards Goal     Problem: Unstable angina/NSTEMI: Day of Admission/Day 1  Goal: Off Pathway (Use only if patient is Off Pathway)  Outcome: Progressing Towards Goal  Goal: Activity/Safety  Outcome: Progressing Towards Goal  Goal: Consults, if ordered  Outcome: Progressing Towards Goal  Goal: Diagnostic Test/Procedures  Outcome: Progressing Towards Goal  Goal: Nutrition/Diet  Outcome: Progressing Towards Goal  Goal: Discharge Planning  Outcome: Progressing Towards Goal  Goal: Medications  Outcome: Progressing Towards Goal  Goal: Respiratory  Outcome: Progressing Towards Goal  Goal: Treatments/Interventions/Procedures  Outcome: Progressing Towards Goal  Goal: Psychosocial  Outcome: Progressing Towards Goal  Goal: *Hemodynamically stable  Outcome: Progressing Towards Goal  Goal: *Optimal pain control at patient's stated goal  Outcome: Progressing Towards Goal  Goal: *Lungs clear or at baseline  Outcome: Progressing Towards Goal     Problem: Unstable angina/NSTEMI: Day 2  Goal: Off Pathway (Use only if patient is Off Pathway)  Outcome: Progressing Towards Goal  Goal: Activity/Safety  Outcome: Progressing Towards Goal  Goal: Consults, if ordered  Outcome: Progressing Towards Goal  Goal: Diagnostic Test/Procedures  Outcome: Progressing Towards Goal  Goal: Nutrition/Diet  Outcome: Progressing Towards Goal  Goal: Discharge Planning  Outcome: Progressing Towards Goal  Goal: Medications  Outcome: Progressing Towards Goal  Goal: Respiratory  Outcome: Progressing Towards Goal  Goal: Treatments/Interventions/Procedures  Outcome: Progressing Towards Goal  Goal: Psychosocial  Outcome: Progressing Towards Goal  Goal: *Hemodynamically stable  Outcome: Progressing Towards Goal  Goal: *Optimal pain control at patient's stated goal  Outcome: Progressing Towards Goal  Goal: *Lungs clear or at baseline  Outcome: Progressing Towards Goal     Problem: Unstable Angina/NSTEMI: Discharge Outcomes  Goal: *Hemodynamically stable  Outcome: Progressing Towards Goal  Goal: *Stable cardiac rhythm  Outcome: Progressing Towards Goal  Goal: *Lungs clear or at baseline  Outcome: Progressing Towards Goal  Goal: *Optimal pain control at patient's stated goal  Outcome: Progressing Towards Goal  Goal: *Identifies cardiac risk factors  Outcome: Progressing Towards Goal  Goal: *Verbalizes home exercise program, activity guidelines, cardiac precautions  Outcome: Progressing Towards Goal  Goal: *Verbalizes understanding and describes prescribed diet  Outcome: Progressing Towards Goal  Goal: *Verbalizes name, dosage, time, side effects, and number of days to continue medications  Outcome: Progressing Towards Goal  Goal: *Anxiety reduced or absent  Outcome: Progressing Towards Goal  Goal: *Understands and describes signs and symptoms to report to providers(Stroke Metric)  Outcome: Progressing Towards Goal  Goal: *Describes follow-up/return visits to physicians  Outcome: Progressing Towards Goal  Goal: *Describes available resources and support systems  Outcome: Progressing Towards Goal  Goal: *Influenza immunization  Outcome: Progressing Towards Goal  Goal: *Pneumococcal immunization  Outcome: Progressing Towards Goal  Goal: *Describes smoking cessation resources  Outcome: Progressing Towards Goal

## 2020-02-03 NOTE — PROGRESS NOTES
Called to room by RN, patient self extubated at this time. Patient placed on 5LPM NC will wean as tolerated. O2 saturations 97%. Patient is currently following commands. RNx2 and MD at bedside at this time.       02/03/20 0901   Oxygen Therapy   O2 Sat (%) (!) 79 %  (placed on NC 5LPM at this time sats increased 96%)   Pulse via Oximetry 99 beats per minute   O2 Device Room air  (placed on NC 5LPM at this time)   Pre-Treatment   Breathing Pattern Tachypneic   Breath Sounds Bilateral Diminished   Left Breath Sounds Diminished   Right Breath Sounds Diminished   Pulse 79   SpO2 99 %   Respirations 22

## 2020-02-03 NOTE — PROGRESS NOTES
Shift Change:  Bedside shift change report given to Eamon Carreno RN (oncoming nurse) by Jennifer Gamble RN (offgoing nurse). Report included the following information SBAR, Kardex, MAR and Recent Results. Shift Summary:  0800- Assessment complete, mouth care provided. Pt raises eyebrows to name, weaning sedation to prepare for SBT. Propofol off, Precedex going at low dose, see MAR. Will continue to monitor. 0900- Pt vent alarming, upon entering room pt was thrashing head wildly and rubbing face against bed while at tonguing at tube. Tube half way out. Extubated pt, placed on 4L O2 via nasal cannula. RT and Dr. Ubaldo Arriola at bedside. Pt following commands. Will continue to monitor. 0930- Received critical results, trending down. Will continue to monitor. 1000- Spoke with Dr. Ubaldo Arriola about psych consult being that pt was admitted for drug overdose. Per MD will hold of on consult given patients current mentation and polysubstance abuse. Also updated on patients lack of code status. Will continue to monitor. 1300- Pt more alert, denies suicidal ideations now or ever, suicide screen performed. Pt states that he did take cocaine prior to admission, does not remeber how he got to hospital. Stats he only drinks 2-3 times a week. Will continue to monitor. A&Ox 3, following commands, no complaints at this rime. Attempted x2 to pull out IV, redirected. Will continue to monitor. 1400- Pt having increased agitation. Restless and attempting to get out of bed. Pt also complaining of \"really bad\" pain in his legs. Precedex restarted, PRN pain medication administered. Will continue to monitor. 1700- Bedside swallow done, pt handled fine. Will continue to monitor. End of Shift Report:  Bedside shift change report given to Mich Machado RN  (oncoming nurse) by Eamon Carreno RN (offgoing nurse). Report included the following information SBAR, Kardex, MAR and Recent Results.

## 2020-02-03 NOTE — H&P
[facility]    ICU Team H&P Note    Name: Rosa Isela Mendez   : 1980   MRN: [id number]   Date: 2/3/2020      Subjective:   H&P Note: 2/3/2020      Patient is asked to be seen by Dr. Shruti Wiley, for concern for STEMI necessitating the need for possible ICU care. Reason for ICU Admission:     43 yo male with history of polysubstance abuse admitted to the hospital with AMS and suspected STEMI. He was found with clean cath but with rhabdomyolysis and NOLAN/transaminitis. Patient was also intubated for airway protection. No significant events overnight. Remains sedated for ETOH management. POD: 1    S/P: Procedure(s):  LEFT HEART CATH  CORONARY ANGIOGRAPHY    Active Problem List:     Problem List  Never Reviewed          Codes Class    Altered mental status ICD-10-CM: R41.82  ICD-9-CM: 780.97               Past Medical History:      has no past medical history on file. Past Surgical History:      has a past surgical history that includes hx orthopaedic. Home Medications:     Prior to Admission medications    Medication Sig Start Date End Date Taking? Authorizing Provider   ondansetron (ZOFRAN ODT) 4 mg disintegrating tablet Take 1 Tab by mouth every eight (8) hours as needed for Nausea. 3/21/18   Pancho Charlton NP       Allergies/Social/Family History:     No Known Allergies   Social History     Tobacco Use    Smoking status: Current Every Day Smoker   Substance Use Topics    Alcohol use: Yes     Comment: social      History reviewed. No pertinent family history. Review of Systems:     A comprehensive review of systems was negative except for that written in the HPI. No fevers, chills, nausea, vomits, diarrhea.      Objective:   Vital Signs:  Visit Vitals  /66   Pulse 68   Temp 99.9 °F (37.7 °C)   Resp 18   Wt 74.1 kg (163 lb 5.8 oz)   SpO2 100%   BMI 22.16 kg/m²      O2 Device: Ventilator, Endotracheal tube Temp (24hrs), Av.3 °F (37.4 °C), Min:98.5 °F (36.9 °C), Max:99.9 °F (37.7 °C)         Intake/Output:     Intake/Output Summary (Last 24 hours) at 2/3/2020 0827  Last data filed at 2/3/2020 0700  Gross per 24 hour   Intake 3485.92 ml   Output 2995 ml   Net 490.92 ml       Physical Exam:    General:  Sedated and on the ventilator. No acute distress. Eyes:  PERRL   Mouth/Throat: Orotracheal tube in place. Neck: Supple, trachea midline   Lungs:   Clear to auscultation bilaterally, good effort. Cardiovascular:  RRR   Abdomen:   Soft, non-tender   Extremities: No cyanosis or edema   Skin: No acute rash or lesions. Musculoskeletal:  No swelling or deformity. Lines/Devices:  Intact, no erythema, drainage, or tenderness. Neuro: Sedated and appears comfortable on ventilator.  Not following commands         LABS AND  DATA: Personally reviewed  Recent Labs     02/03/20  0359 02/02/20  1249   WBC 10.0 15.7*   HGB 13.7 15.2   HCT 40.3 46.8   * 144*     Recent Labs     02/03/20  0359 02/02/20  1808 02/02/20  1802   * 135*  --    K 4.3 4.2  --     102  --    CO2 27 28  --    BUN 25* 31*  --    CREA 1.55* 1.91*  --    GLU 97 92  --    CA 8.6 8.3*  --    MG 1.7  --  1.5*   PHOS 1.5*  --  1.8*     Recent Labs     02/03/20  0359 02/02/20  1249   SGOT >2,000* >2,000*  >2,000*   AP 79 95  92   TP 6.2* 7.6  7.4   ALB 3.3* 4.0  3.8   GLOB 2.9 3.6  3.6     Recent Labs     02/02/20  1249   INR 1.4*   PTP 14.4*      Recent Labs     02/03/20  0446 02/02/20  1347   PHI 7.366 7.367   PCO2I 50.5* 48.3*   PO2I 120* 103*   FIO2I 40 0.40     Recent Labs     02/03/20  0359 02/02/20  1249   CPK 26,105* 37,849*       Hemodynamics:   PAP:   CO:     Wedge:   CI:     CVP:    SVR:       PVR:       Ventilator Settings:  Mode Rate Tidal Volume Pressure FiO2 PEEP   Assist control, Volume control   500 ml    30 %(found on 30) 5 cm H20     Peak airway pressure: 17 cm H2O    Minute ventilation: 9.2 l/min        MEDS: Reviewed    Chest X-Ray: personally reviewed and report checked  XR CHEST PORT   Final Result   IMPRESSION:   1. The ET tube is in satisfactory position      US ABD COMP   Final Result   IMPRESSION: No cholelithiasis or evidence of acute cholecystitis. XR CHEST PORT   Final Result   IMPRESSION: ET tube in appropriate position. Clear lungs. XR CHEST PORT   Final Result   IMPRESSION: No acute cardiopulmonary disease. CT HEAD WO CONT   Final Result   IMPRESSION: No acute intracranial hemorrhage, mass or infarct. Assessment:     ICU Problems:  - Acute encephalopathy - Likely secondary to polysubstance abuse  - Polysubstance abuse - Known ETOH and cocaine use. - Hyperkalemia - Likely secondary to renal failure and rhabdomyolysis  - NOLAN - Secondary to rhabdo. No emergent need for RRT. Non-oliguric  - elevated LFTs - Secondary to rhabdo. Improved. Pending hepatitis panel  - Leukocytosis - Likely reactive. ICU Comprehensive Plan of Care:   Plans for this Shift:  1. Continue CIWA and supportive care. Wean sedation. Check ammonia. 2. Continue to trend LFTs - no synthetic dysfunction. 3. Continue IVF resuscitation, increase to 150mL/hr  4. Trend CK levels  5. Start weaning trial as we wean sedation  6. Continue to monitor UOP    Multidisciplinary Rounds Completed:  No    ABCDEF Bundle/Checklist  Pain Medications: Fentanyl and None  Target RASS: 0 - Alert & Calm - Spontaneously pays attention to caregiver  Sedation Medications: Precedex and Fentanyl  CAM-ICU:  unable to evaluate  Mobility: Bedrest  PT/OT: Not ready   Restraints: Soft wrist restraints  Discussed Plan of Care (goals of care):  Yes  Addressed Code Status: Full Code    CARDIOVASCULAR  Cardiac Gtts: None  SBP Goal of: > 90 mmHg  MAP Goal of: > 65 mmHg  Transfusion Trigger (Hgb): <7 g/dL    RESPIRATORY  Vent Goals:   Chlorhexidine   Optimize PEEP/Ventilation/Oxygenation  Goal Tidal Volume 6 cc/kg based on IBW  Aim for lung protective ventilation  Head of bed > 30 degrees  DVT Prophylaxis (if no, list reason): SCD's or Sequential Compression Device and Heparin   SPO2 Goal: > 92%  Pulmonary toilet: in-line suction     GI/  Castro Catheter Present: Yes  GI Prophylaxis: Pepcid (famotidine)   Nutrition: No   IVFs: LR 150cc/hr  Bowel Movement: N/A  Bowel Regimen: None needed at this time  Insulin: ISS    ANTIBIOTICS  Antibiotics:  None    T/L/D  Tubes: ETT  Lines: Peripheral IV  Drains: Castro Catheter    SPECIAL EQUIPMENT  None    DISPOSITION  Stay in ICU    CRITICAL CARE CONSULTANT NOTE  I had a face to face encounter with the patient, reviewed and interpreted patient data including clinical events, labs, images, vital signs, I/O's, and examined patient. I have discussed the case and the plan and management of the patient's care with the consulting services, the bedside nurses and the respiratory therapist.      NOTE OF PERSONAL INVOLVEMENT IN CARE   This patient has a high probability of imminent, clinically significant deterioration, which requires the highest level of preparedness to intervene urgently. I participated in the decision-making and personally managed or directed the management of the following life and organ supporting interventions that required my frequent assessment to treat or prevent imminent deterioration. I personally spent 35 minutes of critical care time. This is time spent at this critically ill patient's bedside actively involved in patient care as well as the coordination of care and discussions with the patient's family. This does not include any procedural time which has been billed separately.     Kraig Morrison MD  Staff [address] [facility] [address]  2/3/2020

## 2020-02-03 NOTE — PROGRESS NOTES
[facility] Cardiology Progress Note    Date of service: 2/3/2020    Subjective:  Mr Valerie Ahumada remains intubated    Objective:    Visit Vitals  /56 (BP 1 Location: Left arm, BP Patient Position: At rest)   Pulse 68   Temp 99 °F (37.2 °C)   Resp 18   Wt 74.1 kg (163 lb 5.8 oz)   SpO2 100%   BMI 22.16 kg/m²       Physical Exam   Constitutional: He is oriented to person, place, and time. He appears well-developed and well-nourished. He is intubated. HENT:   Head: Normocephalic and atraumatic. Eyes: Conjunctivae are normal. No scleral icterus. Neck: No JVD present. Cardiovascular: Normal rate, regular rhythm and normal heart sounds. Exam reveals no gallop. No murmur heard. Pulmonary/Chest: Effort normal and breath sounds normal. No stridor. He is intubated. No respiratory distress. He has no wheezes. He has no rales. Abdominal: Soft. He exhibits no distension. Musculoskeletal:         General: No deformity or edema. Neurological: He is alert and oriented to person, place, and time. Skin: Skin is warm and dry. Psychiatric: He has a normal mood and affect. His behavior is normal.       Data reviewed:  Recent Results (from the past 12 hour(s))   CBC WITH AUTOMATED DIFF    Collection Time: 02/03/20  3:59 AM   Result Value Ref Range    WBC 10.0 4.1 - 11.1 K/uL    RBC 4.76 4.10 - 5.70 M/uL    HGB 13.7 12.1 - 17.0 g/dL    HCT 40.3 36.6 - 50.3 %    MCV 84.7 80.0 - 99.0 FL    MCH 28.8 26.0 - 34.0 PG    MCHC 34.0 30.0 - 36.5 g/dL    RDW 14.4 11.5 - 14.5 %    PLATELET 022 (L) 174 - 400 K/uL    MPV 10.6 8.9 - 12.9 FL    NRBC 0.2 (H) 0  WBC    ABSOLUTE NRBC 0.02 (H) 0.00 - 0.01 K/uL    NEUTROPHILS 76 (H) 32 - 75 %    LYMPHOCYTES 18 12 - 49 %    MONOCYTES 5 5 - 13 %    EOSINOPHILS 1 0 - 7 %    BASOPHILS 0 0 - 1 %    IMMATURE GRANULOCYTES 0 0.0 - 0.5 %    ABS. NEUTROPHILS 7.5 1.8 - 8.0 K/UL    ABS. LYMPHOCYTES 1.8 0.8 - 3.5 K/UL    ABS. MONOCYTES 0.5 0.0 - 1.0 K/UL    ABS.  EOSINOPHILS 0.1 0.0 - 0.4 K/UL ABS. BASOPHILS 0.0 0.0 - 0.1 K/UL    ABS. IMM. GRANS. 0.0 0.00 - 0.04 K/UL    DF AUTOMATED     MAGNESIUM    Collection Time: 02/03/20  3:59 AM   Result Value Ref Range    Magnesium 1.7 1.6 - 2.4 mg/dL   PHOSPHORUS    Collection Time: 02/03/20  3:59 AM   Result Value Ref Range    Phosphorus 1.5 (L) 2.6 - 4.7 MG/DL   CK    Collection Time: 02/03/20  3:59 AM   Result Value Ref Range    CK 26,105 (HH) 39 - 570 U/L   METABOLIC PANEL, COMPREHENSIVE    Collection Time: 02/03/20  3:59 AM   Result Value Ref Range    Sodium 135 (L) 136 - 145 mmol/L    Potassium 4.3 3.5 - 5.1 mmol/L    Chloride 104 97 - 108 mmol/L    CO2 27 21 - 32 mmol/L    Anion gap 4 (L) 5 - 15 mmol/L    Glucose 97 65 - 100 mg/dL    BUN 25 (H) 6 - 20 MG/DL    Creatinine 1.55 (H) 0.70 - 1.30 MG/DL    BUN/Creatinine ratio 16 12 - 20      GFR est AA >60 >60 ml/min/1.73m2    GFR est non-AA 51 (L) >60 ml/min/1.73m2    Calcium 8.6 8.5 - 10.1 MG/DL    Bilirubin, total 0.5 0.2 - 1.0 MG/DL    ALT (SGPT) 1,484 (H) 12 - 78 U/L    AST (SGOT) >2,000 (H) 15 - 37 U/L    Alk. phosphatase 79 45 - 117 U/L    Protein, total 6.2 (L) 6.4 - 8.2 g/dL    Albumin 3.3 (L) 3.5 - 5.0 g/dL    Globulin 2.9 2.0 - 4.0 g/dL    A-G Ratio 1.1 1.1 - 2.2     POC EG7    Collection Time: 02/03/20  4:46 AM   Result Value Ref Range    Calcium, ionized (POC) 1.23 1.12 - 1.32 mmol/L    FIO2 (POC) 40 %    pH (POC) 7.366 7.35 - 7.45      pCO2 (POC) 50.5 (H) 35.0 - 45.0 MMHG    pO2 (POC) 120 (H) 80 - 100 MMHG    HCO3 (POC) 28.9 (H) 22 - 26 MMOL/L    Base excess (POC) 4 mmol/L    sO2 (POC) 98 (H) 92 - 97 %    Site LEFT RADIAL      Device: VENT      Mode ASSIST CONTROL      Tidal volume 500 ml    Set Rate 17 bpm    PEEP/CPAP (POC) 5 cmH2O    Allens test (POC) YES      Specimen type (POC) ARTERIAL      Total resp. rate 17         Assessment:  1. Non-MI troponin elevation  Normal coronaries at cath  Likely secondary to polysubstance abuse & rhabdomyolysis  2.  Abnormal EKG: Brugada pattern per Dr Mat Stahl --> to avoid QT prolonging agents. 3. Acute respiratory failure - intubated for airway protection  4. Acute renal failure  5. Rhabdomyolysis  6. Polysubstance abuse    Plan:    Continue supportive cares  Extubate when primary care team feels appropriate  No further cardiac work-up / specific therapy needed  Signing off since primary issues are non-cardiac  Available to see upon request as needed    Signed:  Salty Pemberton MD  Interventional Cardiology  2/3/2020

## 2020-02-03 NOTE — PROGRESS NOTES
Bedside and Verbal shift change report given to VALENTÍN (oncoming nurse) by Katelin Nevarez (offgoing nurse). Report included the following information SBAR, Kardex, Intake/Output, MAR, Accordion, Recent Results, Cardiac Rhythm - and Alarm Parameters . 2000 - Assumed care. Pt restless, alert, fighting vent. MD made aware, orders received for precedex. 2100 - CHG bath given. 200 - Spoke with family, update given. 0400 - AM labs drawn. AM CXR completed. SAT performed. Pt restless, agitated, bucking vent, sedation restarted. 0700 - Spoke w/ family, update given. Bedside and Verbal shift change report given to Zander (oncoming nurse) by VALENTÍN (offgoing nurse). Report included the following information SBAR, Kardex, Intake/Output, MAR, Accordion, Recent Results, Cardiac Rhythm -NSR and Alarm Parameters .

## 2020-02-04 LAB
ALBUMIN SERPL-MCNC: 3.1 G/DL (ref 3.5–5)
ALBUMIN/GLOB SERPL: 1 {RATIO} (ref 1.1–2.2)
ALP SERPL-CCNC: 75 U/L (ref 45–117)
ALT SERPL-CCNC: 1124 U/L (ref 12–78)
ANION GAP SERPL CALC-SCNC: 5 MMOL/L (ref 5–15)
ANION GAP SERPL CALC-SCNC: 9 MMOL/L (ref 5–15)
AST SERPL-CCNC: 1275 U/L (ref 15–37)
BASOPHILS # BLD: 0 K/UL (ref 0–0.1)
BASOPHILS NFR BLD: 0 % (ref 0–1)
BILIRUB SERPL-MCNC: 0.7 MG/DL (ref 0.2–1)
BUN SERPL-MCNC: 14 MG/DL (ref 6–20)
BUN SERPL-MCNC: 15 MG/DL (ref 6–20)
BUN/CREAT SERPL: 15 (ref 12–20)
BUN/CREAT SERPL: 15 (ref 12–20)
CALCIUM SERPL-MCNC: 8.3 MG/DL (ref 8.5–10.1)
CALCIUM SERPL-MCNC: 8.7 MG/DL (ref 8.5–10.1)
CHLORIDE SERPL-SCNC: 104 MMOL/L (ref 97–108)
CHLORIDE SERPL-SCNC: 106 MMOL/L (ref 97–108)
CK SERPL-CCNC: ABNORMAL U/L (ref 39–308)
CK SERPL-CCNC: ABNORMAL U/L (ref 39–308)
CO2 SERPL-SCNC: 26 MMOL/L (ref 21–32)
CO2 SERPL-SCNC: 28 MMOL/L (ref 21–32)
CREAT SERPL-MCNC: 0.96 MG/DL (ref 0.7–1.3)
CREAT SERPL-MCNC: 1.02 MG/DL (ref 0.7–1.3)
DIFFERENTIAL METHOD BLD: ABNORMAL
EOSINOPHIL # BLD: 0.1 K/UL (ref 0–0.4)
EOSINOPHIL NFR BLD: 1 % (ref 0–7)
ERYTHROCYTE [DISTWIDTH] IN BLOOD BY AUTOMATED COUNT: 13.6 % (ref 11.5–14.5)
GLOBULIN SER CALC-MCNC: 3.1 G/DL (ref 2–4)
GLUCOSE BLD STRIP.AUTO-MCNC: 84 MG/DL (ref 65–100)
GLUCOSE SERPL-MCNC: 77 MG/DL (ref 65–100)
GLUCOSE SERPL-MCNC: 82 MG/DL (ref 65–100)
HAV IGM SER QL: NONREACTIVE
HBV CORE IGM SER QL: NONREACTIVE
HBV SURFACE AG SER QL: 0.34 INDEX
HBV SURFACE AG SER QL: NEGATIVE
HCT VFR BLD AUTO: 37.9 % (ref 36.6–50.3)
HCV AB SERPL QL IA: NONREACTIVE
HCV COMMENT,HCGAC: NORMAL
HGB BLD-MCNC: 13.1 G/DL (ref 12.1–17)
IMM GRANULOCYTES # BLD AUTO: 0.1 K/UL (ref 0–0.04)
IMM GRANULOCYTES NFR BLD AUTO: 1 % (ref 0–0.5)
LYMPHOCYTES # BLD: 1.8 K/UL (ref 0.8–3.5)
LYMPHOCYTES NFR BLD: 18 % (ref 12–49)
MCH RBC QN AUTO: 29.2 PG (ref 26–34)
MCHC RBC AUTO-ENTMCNC: 34.6 G/DL (ref 30–36.5)
MCV RBC AUTO: 84.4 FL (ref 80–99)
MONOCYTES # BLD: 0.7 K/UL (ref 0–1)
MONOCYTES NFR BLD: 7 % (ref 5–13)
NEUTS SEG # BLD: 7.3 K/UL (ref 1.8–8)
NEUTS SEG NFR BLD: 72 % (ref 32–75)
NRBC # BLD: 0 K/UL (ref 0–0.01)
NRBC BLD-RTO: 0 PER 100 WBC
PLATELET # BLD AUTO: 150 K/UL (ref 150–400)
PMV BLD AUTO: 10.7 FL (ref 8.9–12.9)
POTASSIUM SERPL-SCNC: 3.8 MMOL/L (ref 3.5–5.1)
POTASSIUM SERPL-SCNC: 4 MMOL/L (ref 3.5–5.1)
PROT SERPL-MCNC: 6.2 G/DL (ref 6.4–8.2)
RBC # BLD AUTO: 4.49 M/UL (ref 4.1–5.7)
SERVICE CMNT-IMP: NORMAL
SODIUM SERPL-SCNC: 139 MMOL/L (ref 136–145)
SODIUM SERPL-SCNC: 139 MMOL/L (ref 136–145)
SP1: NORMAL
SP2: NORMAL
SP3: NORMAL
WBC # BLD AUTO: 10.1 K/UL (ref 4.1–11.1)

## 2020-02-04 PROCEDURE — 82962 GLUCOSE BLOOD TEST: CPT

## 2020-02-04 PROCEDURE — 36415 COLL VENOUS BLD VENIPUNCTURE: CPT

## 2020-02-04 PROCEDURE — 82550 ASSAY OF CK (CPK): CPT

## 2020-02-04 PROCEDURE — 74011000250 HC RX REV CODE- 250: Performed by: INTERNAL MEDICINE

## 2020-02-04 PROCEDURE — 80053 COMPREHEN METABOLIC PANEL: CPT

## 2020-02-04 PROCEDURE — 74011250636 HC RX REV CODE- 250/636: Performed by: INTERNAL MEDICINE

## 2020-02-04 PROCEDURE — 65660000001 HC RM ICU INTERMED STEPDOWN

## 2020-02-04 PROCEDURE — 74011250637 HC RX REV CODE- 250/637: Performed by: INTERNAL MEDICINE

## 2020-02-04 PROCEDURE — 93005 ELECTROCARDIOGRAM TRACING: CPT

## 2020-02-04 PROCEDURE — 85025 COMPLETE CBC W/AUTO DIFF WBC: CPT

## 2020-02-04 RX ORDER — LORAZEPAM 2 MG/ML
4 INJECTION INTRAMUSCULAR
Status: DISCONTINUED | OUTPATIENT
Start: 2020-02-04 | End: 2020-02-06

## 2020-02-04 RX ORDER — HALOPERIDOL 5 MG/ML
4 INJECTION INTRAMUSCULAR
Status: DISCONTINUED | OUTPATIENT
Start: 2020-02-04 | End: 2020-02-04

## 2020-02-04 RX ORDER — LORAZEPAM 2 MG/ML
2 INJECTION INTRAMUSCULAR
Status: DISCONTINUED | OUTPATIENT
Start: 2020-02-04 | End: 2020-02-07 | Stop reason: HOSPADM

## 2020-02-04 RX ORDER — ONDANSETRON 2 MG/ML
4 INJECTION INTRAMUSCULAR; INTRAVENOUS
Status: DISCONTINUED | OUTPATIENT
Start: 2020-02-04 | End: 2020-02-04

## 2020-02-04 RX ADMIN — LORAZEPAM 4 MG: 1 TABLET ORAL at 00:15

## 2020-02-04 RX ADMIN — LORAZEPAM 4 MG: 2 INJECTION INTRAMUSCULAR; INTRAVENOUS at 16:33

## 2020-02-04 RX ADMIN — DIAZEPAM 5 MG: 5 INJECTION, SOLUTION INTRAMUSCULAR; INTRAVENOUS at 06:05

## 2020-02-04 RX ADMIN — LORAZEPAM 4 MG: 2 INJECTION INTRAMUSCULAR; INTRAVENOUS at 07:17

## 2020-02-04 RX ADMIN — DIAZEPAM 5 MG: 5 INJECTION, SOLUTION INTRAMUSCULAR; INTRAVENOUS at 14:19

## 2020-02-04 RX ADMIN — Medication 30 ML: at 06:05

## 2020-02-04 RX ADMIN — LORAZEPAM 4 MG: 2 INJECTION INTRAMUSCULAR; INTRAVENOUS at 20:16

## 2020-02-04 RX ADMIN — Medication 10 ML: at 22:42

## 2020-02-04 RX ADMIN — LORAZEPAM 4 MG: 2 INJECTION INTRAMUSCULAR; INTRAVENOUS at 02:50

## 2020-02-04 RX ADMIN — Medication 10 ML: at 14:00

## 2020-02-04 RX ADMIN — LORAZEPAM 4 MG: 2 INJECTION INTRAMUSCULAR; INTRAVENOUS at 11:05

## 2020-02-04 RX ADMIN — DIAZEPAM 5 MG: 5 INJECTION, SOLUTION INTRAMUSCULAR; INTRAVENOUS at 22:41

## 2020-02-04 RX ADMIN — ENOXAPARIN SODIUM 40 MG: 40 INJECTION SUBCUTANEOUS at 22:42

## 2020-02-04 RX ADMIN — LORAZEPAM 2 MG: 2 INJECTION INTRAMUSCULAR; INTRAVENOUS at 12:58

## 2020-02-04 RX ADMIN — ONDANSETRON 4 MG: 2 INJECTION INTRAMUSCULAR; INTRAVENOUS at 00:47

## 2020-02-04 RX ADMIN — FOLIC ACID: 5 INJECTION, SOLUTION INTRAMUSCULAR; INTRAVENOUS; SUBCUTANEOUS at 09:22

## 2020-02-04 NOTE — PROGRESS NOTES
Problem: Pressure Injury - Risk of  Goal: *Prevention of pressure injury  Description  Document Luis Scale and appropriate interventions in the flowsheet. Outcome: Progressing Towards Goal  Note: Pressure Injury Interventions:  Sensory Interventions: Check visual cues for pain, Keep linens dry and wrinkle-free, Maintain/enhance activity level         Activity Interventions: Increase time out of bed, Pressure redistribution bed/mattress(bed type), PT/OT evaluation    Mobility Interventions: HOB 30 degrees or less, Pressure redistribution bed/mattress (bed type), PT/OT evaluation    Nutrition Interventions: Document food/fluid/supplement intake    Friction and Shear Interventions: HOB 30 degrees or less                Problem: Patient Education: Go to Patient Education Activity  Goal: Patient/Family Education  Outcome: Progressing Towards Goal     Problem: Falls - Risk of  Goal: *Absence of Falls  Description  Document Nona Fall Risk and appropriate interventions in the flowsheet. Outcome: Progressing Towards Goal  Note: Fall Risk Interventions:  Mobility Interventions: Patient to call before getting OOB    Mentation Interventions: Door open when patient unattended, Evaluate medications/consider consulting pharmacy    Medication Interventions: Evaluate medications/consider consulting pharmacy, Patient to call before getting OOB, Teach patient to arise slowly    Elimination Interventions: Call light in reach, Patient to call for help with toileting needs    History of Falls Interventions:  Investigate reason for fall, Evaluate medications/consider consulting pharmacy         Problem: Patient Education: Go to Patient Education Activity  Goal: Patient/Family Education  Outcome: Progressing Towards Goal     Problem: Infection - Risk of, Urinary Catheter-Associated Urinary Tract Infection  Goal: *Absence of infection signs and symptoms  Outcome: Progressing Towards Goal     Problem: Patient Education: Go to Patient Education Activity  Goal: Patient/Family Education  Outcome: Progressing Towards Goal     Problem: Patient Education: Go to Patient Education Activity  Goal: Patient/Family Education  Outcome: Progressing Towards Goal     Problem: Alcohol Withdrawal  Goal: *STG: Participates in treatment plan  Outcome: Progressing Towards Goal  Goal: *STG: Remains safe in hospital  Outcome: Progressing Towards Goal  Goal: *STG: Seeks staff when symptoms of withdrawal increase  Outcome: Progressing Towards Goal  Goal: *STG: Complies with medication therapy  Outcome: Progressing Towards Goal  Goal: *STG: Attends activities and groups  Outcome: Progressing Towards Goal  Goal: *STG: Will identify negative impact of chemical dependency including the use of tobacco, alcohol, and other substances  Outcome: Progressing Towards Goal  Goal: *STG: Verbalizes abstinence as an achievable goal  Outcome: Progressing Towards Goal  Goal: *STG: Agrees to participate in outpatient after care program to support ongoing mental health  Outcome: Progressing Towards Goal  Goal: *STG: Able to indentify relapse triggers including interpersonal/social and familial factors  Outcome: Progressing Towards Goal  Goal: *STG: Identify lifestyle changes to support long term sobriety such as vocation, employment, education, and legal issues  Outcome: Progressing Towards Goal  Goal: *STG: Maintains appropriate nutrition and hydration  Outcome: Progressing Towards Goal  Goal: *STG: Vital signs within defined limits  Outcome: Progressing Towards Goal  Goal: *STG/LTG: Relapse prevention plan in place to include housing/aftercare, leisure activities, and spirituality  Outcome: Progressing Towards Goal  Goal: Interventions  Outcome: Progressing Towards Goal     Problem: Patient Education: Go to Patient Education Activity  Goal: Patient/Family Education  Outcome: Progressing Towards Goal     Problem: Pain  Goal: *Control of Pain  Outcome: Progressing Towards Goal  Goal: *PALLIATIVE CARE:  Alleviation of Pain  Outcome: Progressing Towards Goal     Problem: Patient Education: Go to Patient Education Activity  Goal: Patient/Family Education  Outcome: Progressing Towards Goal

## 2020-02-04 NOTE — PROGRESS NOTES
Transitions of Care Plan:    Patient undergoing CIWA protocol; active withdrawal    Confused today    CM participated in IDR this AM. Patient on IV ativan for CIWA withdrawal protocol. Patient confused and lethargic today. CM will continue to follow as patient medically progresses.     Yoel Devi, MPH

## 2020-02-04 NOTE — PROGRESS NOTES
[facility] Adult  Hospitalist Group     ICU Transfer/Accept Summary     This patient is being transferred [place] [address] ICU  DATE OF TRANSFER: 2/4/2020       PATIENT ID: Oziel Sellers  MRN: [id number]   YOB: 1980    PRIMARY CARE PROVIDER: None   DATE OF ADMISSION: 2/2/2020 10:27 AM    ATTENDING PHYSICIAN: Ashely Rivera MD  CONSULTATIONS:   None    PROCEDURES/SURGERIES:   Procedure(s):  LEFT HEART CATH  CORONARY ANGIOGRAPHY    REASON FOR ADMISSION: <principal problem not specified>     HOSPITAL PROBLEM LIST:  Patient Active Problem List   Diagnosis Code    Altered mental status R41.82         Brief HPI and Hospital Course:      Mr. Herbert Disla is a 40y/o with polysubstance abuse, who presented to the ED with aphasia, increased solmnolence and weakness, potential overdose vs intoxication. Troponin was elevated on admission, and EKG with some concern for STEMI, so code was called. He was intubated in the emergency room, reportedly in preparation for LHC due to concern for NSTEMI. UDS on admission was positive for cocaine, amphetamines, and opiates. EKG on admission showed Brugada's pattern type I. LHC was done on 02/02 on admission, normal coronaries and normal EF. Initial EKG findings were likely secondary to Brugada pattern. After his catheterization CK level was checked and was found to be elevated (< 35,000). Likely elevated troponin was a function of his severe rhabdomyolysis. Nevertheless due to a Brugada pattern, we do need to avoid QT prolonging drugs. He was extubated on 02/03, while he was intubated he was on propofol and Precedex, and these have since been withdrawn. Due to his agitation, and unfortunately unable to be administer any antipsychotics, he has been on scheduled diazepam with PRN IV Ativan. Patient on my interview is somnolent, calm, able to shake his head yes to drinking daily, however unable to tell me how much.   Per nursing staff his mom stated that he was on the floor for about 2 to 3 days, and is unsure when his last drink was    Assessment and Plan:    Alcohol withdrawal -unclear how much alcohol patient drinks and when the last drink was  -CIWA protocol, PRN IV Ativan as needed  -Patient stable for IMCU, intermediate care as long as protecting airway and not having any respiratory compromise from Ativan administration    Acute rhabdomyolysis  -CK over 39,000 on admission, downtrending  -CK daily   -Continue IVF    Elevated troponin - secondary to elevated CK  - s/p [facility] 02/02 with negative CAD, normal EF  - Cardiology following, have signed off    Brugada pattern on EKG  - Some reports of Brugada occurring after cocaine intoxication.  - Repeat EKG today    Polysubstance abuse with intoxication and ?overdose- UDS + cocaine, methamphetamine, cocaine  - Monitor   - IV ativan as needed  - Cannot administer antipsychotics due to Brugada syndrome  - Consult inpatient psychiatry     NOLAN stage 2 - Cr up to 1.9, from normal baseline, now improved. - Secondary to rhabdomyolysis   - I and O   - Continue IVF    Elevated LFTs - unclear etiology, suspect secondary to hypotension and shock vs. Alcohol abuse   - hepatitis panel negative  - Downtrending, continue to follow    Stable for downgrade to IMCU  Expected LOS - 3-5 days depending on CIWA scoring  Attempted to call mPOA (mother) without success today   FULL CODE          PHYSICAL EXAMINATION:  Visit Vitals  BP (!) 113/98   Pulse 79   Temp 99.7 °F (37.6 °C)   Resp 26   Wt 74.6 kg (164 lb 7.4 oz)   SpO2 100%   BMI 22.31 kg/m²       General:          Drowsy but able to be aroused, calm  HEENT:           Atraumatic, MMM            Neck:               Supple, symmetrical,  thyroid: non tender  Lungs:             Clear to auscultation bilaterally. No Wheezing or Rhonchi. No rales. Heart:              Regular  rhythm,  No  murmur   No edema  Abdomen:       Soft, non-tender. Not distended.   Bowel sounds normal  Extremities:     No cyanosis. No clubbing,  +2 distal pulses  Skin:                Not pale. Not Jaundiced  No rashes   Psych:             Not anxious or agitated.   Neurologic:      Drowsy, not awake enough to participate in full examination    CODE STATUS:  X Full Code    DNR    Partial    Comfort Care     Signed:   Phuong Hernandez MD  Date of Service:  2/4/2020  12:02 PM

## 2020-02-04 NOTE — INTERDISCIPLINARY ROUNDS
IDR/SLIDR Summary          Patient: Jacy Saleh MRN: [id number]    Age: 42 y.o. YOB: 1980 Room/Bed: [address] [place] [address]   Admit Diagnosis: Altered mental status [R41.82]  Principal Diagnosis: <principal problem not specified>   Goals: more awake and appropriate  Readmission: NO  Quality Measure: SCIP  VTE Prophylaxis: Chemical  Influenza Vaccine screening completed? YES  Pneumococcal Vaccine screening completed? NO  Mobility needs: Yes   Nutrition plan:Yes  Consults:Speech, Respiratory and Case Management    Financial concerns:Yes  Escalated to CM? YES  RRAT Score: 13   Interventions:H2H  Testing due for pt today? no  LOS: 1 days Expected length of stay ?  days  Discharge plan: tbd   PCP: None  Transportation needs: Yes    Days before discharge:two or more days before discharge   Discharge disposition: Home    Signed:     James Griffiths RN  2/3/2020  10:24 PM

## 2020-02-04 NOTE — PROGRESS NOTES
[facility]    ICU Team Progress Note    Name: Hector Graves   : 1980   MRN: [id number]   Date: 2020      Subjective:   H&P Note: 2020      Patient is asked to be seen by Dr. Maggi Olguin, for concern for STEMI necessitating the need for possible ICU care. Reason for ICU Admission:     41 yo male with history of polysubstance abuse admitted to the hospital with AMS and suspected STEMI. He was found with clean cath but with rhabdomyolysis and NOLAN/transaminitis. Patient was also intubated for airway protection. Patient extubated 2/3/11897. Overnight patient weaned off precedex and switched to IV ativan for withdrawal.       POD: 2    S/P: Procedure(s):  LEFT HEART CATH  CORONARY ANGIOGRAPHY    Active Problem List:     Problem List  Never Reviewed          Codes Class    Altered mental status ICD-10-CM: R41.82  ICD-9-CM: 780.97               Past Medical History:      has no past medical history on file. Past Surgical History:      has a past surgical history that includes hx orthopaedic. Home Medications:     Prior to Admission medications    Medication Sig Start Date End Date Taking? Authorizing Provider   ondansetron (ZOFRAN ODT) 4 mg disintegrating tablet Take 1 Tab by mouth every eight (8) hours as needed for Nausea. 3/21/18   Mayda Morris NP       Allergies/Social/Family History:     No Known Allergies   Social History     Tobacco Use    Smoking status: Current Every Day Smoker   Substance Use Topics    Alcohol use: Yes     Comment: social      History reviewed. No pertinent family history. Review of Systems:     A comprehensive review of systems was negative except for that written in the HPI.     Objective:   Vital Signs:  Visit Vitals  /73   Pulse 84   Temp 99.7 °F (37.6 °C)   Resp 18   Wt 74.6 kg (164 lb 7.4 oz)   SpO2 96%   BMI 22.31 kg/m²    O2 Flow Rate (L/min): 2 l/min O2 Device: Room air Temp (24hrs), Av.3 °F (37.9 °C), Min:99.7 °F (37.6 °C), Max:101.7 °F (38.7 °C)         Intake/Output:     Intake/Output Summary (Last 24 hours) at 2/4/2020 0821  Last data filed at 2/4/2020 0819  Gross per 24 hour   Intake 162.28 ml   Output 2445 ml   Net -2282.72 ml       Physical Exam:    General:  NAD. Eyes:  PERRL           Lungs:   Clear to auscultation bilaterally, good effort. Cardiovascular:  RRR   Abdomen:   Soft, non-tender       Skin: No acute rash or lesions. Lines/Devices:  Intact, no erythema, drainage, or tenderness. Neuro: Somnolent, easily arouses to verbal stimulation. Not following commands         LABS AND  DATA: Personally reviewed  Recent Labs     02/04/20 0342 02/03/20  0359   WBC 10.1 10.0   HGB 13.1 13.7   HCT 37.9 40.3    147*     Recent Labs     02/04/20 0342 02/03/20 2242 02/03/20 0359 02/02/20  1802    139 135*   < >  --    K 3.8 4.0 4.3   < >  --     104 104   < >  --    CO2 28 26 27   < >  --    BUN 14 15 25*   < >  --    CREA 0.96 1.02 1.55*   < >  --    GLU 77 82 97   < >  --    CA 8.3* 8.7 8.6   < >  --    MG  --   --  1.7  --  1.5*   PHOS  --   --  1.5*  --  1.8*    < > = values in this interval not displayed. Recent Labs     02/04/20 0342 02/03/20  0359   SGOT 1,275* >2,000*   AP 75 79   TP 6.2* 6.2*   ALB 3.1* 3.3*   GLOB 3.1 2.9     Recent Labs     02/02/20  1249   INR 1.4*   PTP 14.4*      Recent Labs     02/03/20  0446 02/02/20  1347   PHI 7.366 7.367   PCO2I 50.5* 48.3*   PO2I 120* 103*   FIO2I 40 0.40     Recent Labs     02/04/20 0342 02/03/20  2242   CPK 20,658* 20,008*       Hemodynamics:   PAP:   CO:     Wedge:   CI:     CVP:    SVR:       PVR:       Ventilator Settings:  Mode Rate Tidal Volume Pressure FiO2 PEEP   Assist control, Volume control   500 ml    30 %(found on 30) 5 cm H20     Peak airway pressure: 17 cm H2O    Minute ventilation: 9.2 l/min        MEDS: Reviewed    Chest X-Ray: personally reviewed and report checked  XR CHEST PORT   Final Result   IMPRESSION:   1. The ET tube is in satisfactory position      US ABD COMP   Final Result   IMPRESSION: No cholelithiasis or evidence of acute cholecystitis. XR CHEST PORT   Final Result   IMPRESSION: ET tube in appropriate position. Clear lungs. XR CHEST PORT   Final Result   IMPRESSION: No acute cardiopulmonary disease. CT HEAD WO CONT   Final Result   IMPRESSION: No acute intracranial hemorrhage, mass or infarct. Assessment:     ICU Problems:  - Acute encephalopathy - Likely secondary to polysubstance abuse and alcohol withdrawal. Ammonia marginally elevated. - Polysubstance abuse - Known ETOH and cocaine use. - Hyperkalemia - Resolved. Likely secondary to renal failure and rhabdomyolysis  - NOLAN - Secondary to rhabdo. Improved. No emergent need for RRT. Non-oliguric  - elevated LFTs - Secondary to rhabdo. Improved. Negative hepatitis panel  - Leukocytosis - Likely reactive. Resolved      ICU Comprehensive Plan of Care:   Plans for this Shift:  1. Continue CIWA and supportive care. 2. Start Lactulose  3. Continue to trend LFTs - Improved with downward trend of CK  4. Continue IVF resuscitation. Stopped overnight, will resume at 150mL/hr  5. Continue to trend CK levels  6. Continue to monitor UOP    Multidisciplinary Rounds Completed:  No    ABCDEF Bundle/Checklist  Pain Medications: None  Target RASS: 0 - Alert & Calm - Spontaneously pays attention to caregiver  Sedation Medications: None  CAM-ICU:  Positive  Mobility: Fair  PT/OT: Not ready   Restraints: None needed at this time  Discussed Plan of Care (goals of care): Yes  Addressed Code Status: Full Code    CARDIOVASCULAR  Cardiac Gtts: None  SBP Goal of: > 90 mmHg  MAP Goal of: > 65 mmHg  Transfusion Trigger (Hgb): <7 g/dL    RESPIRATORY  Vent Goals:    Incentive spirometry  DVT Prophylaxis (if no, list reason): SCD's or Sequential Compression Device and Heparin   SPO2 Goal: > 92%  Pulmonary toilet: Incentive Spirometry GI/  Castro Catheter Present: No  GI Prophylaxis: Not at this time   Nutrition: Yes   IVFs: LR 150cc/hr  Bowel Movement: N/A  Bowel Regimen: Lactulose  Insulin: ISS    ANTIBIOTICS  Antibiotics:  None    T/L/D  Tubes: None  Lines: Peripheral IV  Drains: None    SPECIAL EQUIPMENT  None    DISPOSITION  Transfer to non-ICU bed    Level 3    Pedro Singletary MD  Staff [facility]  2/4/2020

## 2020-02-04 NOTE — PROGRESS NOTES
TRANSFER - IN REPORT:    Verbal report received from Juaquin Valle, [facility] (name) on Lincoln Castrejon  being received from CCU (unit) for routine progression of care      Report consisted of patients Situation, Background, Assessment and   Recommendations(SBAR). Information from the following report(s) SBAR, Kardex, Intake/Output, MAR and Recent Results was reviewed with the receiving nurse. Opportunity for questions and clarification was provided. Assessment completed upon patients arrival to unit and care assumed. 200 Pt arrived to unit and tele confirmed with monitor tech. 1806 Pt increasingly agitated. Pt removed R IV.     1930 Bedside shift change report given to Richelle Martinez RN (oncoming nurse) by Willie Wall RN (offgoing nurse). Report included the following information SBAR, Kardex, Intake/Output, MAR and Recent Results.

## 2020-02-04 NOTE — PROGRESS NOTES
2000 Received report from Brady Levin and assumed care. VSS, no signs of pain. Unable to complete CIWA because pt unable to answer questions. 2200 Complete chlorhexidine bed bath provided, tolerated well. Labs drawn. 0030 Confused, attempting to get up, unable to follow commands. Ativan given. Complains of nausea, order received for zofran. 0230 Cursing and yelling at me, refuses to follow commands. Refusing pills. Dr. Muñoz Spine notified and ativan order changed to IV. 0250 Ativan given, sitter remains at side, will continue to monitor. 0400 Labs drawn. 0715 Pulling off leads, cursing and combative. Ativan given. 0730 Bedside and Verbal shift change report given to Crystal Lino (oncoming nurse) by Tl Bhat (offgoing nurse). Report included the following information SBAR, Kardex, Procedure Summary, Intake/Output, MAR, Recent Results and Alarm Parameters .

## 2020-02-04 NOTE — PROGRESS NOTES
Bedside shift change report given to Twan Zendejas (oncoming nurse) by Vincenzo Rafdord (offgoing nurse). Report included the following information SBAR, Kardex, MAR and Recent Results.

## 2020-02-05 LAB
ALBUMIN SERPL-MCNC: 3.1 G/DL (ref 3.5–5)
ALBUMIN/GLOB SERPL: 1 {RATIO} (ref 1.1–2.2)
ALP SERPL-CCNC: 73 U/L (ref 45–117)
ALT SERPL-CCNC: 874 U/L (ref 12–78)
ANION GAP SERPL CALC-SCNC: 5 MMOL/L (ref 5–15)
AST SERPL-CCNC: 684 U/L (ref 15–37)
ATRIAL RATE: 74 BPM
BASOPHILS # BLD: 0 K/UL (ref 0–0.1)
BASOPHILS NFR BLD: 0 % (ref 0–1)
BILIRUB SERPL-MCNC: 0.8 MG/DL (ref 0.2–1)
BUN SERPL-MCNC: 10 MG/DL (ref 6–20)
BUN/CREAT SERPL: 11 (ref 12–20)
CALCIUM SERPL-MCNC: 8.5 MG/DL (ref 8.5–10.1)
CALCULATED P AXIS, ECG09: 73 DEGREES
CALCULATED R AXIS, ECG10: 63 DEGREES
CALCULATED T AXIS, ECG11: 48 DEGREES
CHLORIDE SERPL-SCNC: 103 MMOL/L (ref 97–108)
CK SERPL-CCNC: ABNORMAL U/L (ref 39–308)
CO2 SERPL-SCNC: 29 MMOL/L (ref 21–32)
CREAT SERPL-MCNC: 0.87 MG/DL (ref 0.7–1.3)
DIAGNOSIS, 93000: NORMAL
DIFFERENTIAL METHOD BLD: ABNORMAL
EOSINOPHIL # BLD: 0.5 K/UL (ref 0–0.4)
EOSINOPHIL NFR BLD: 5 % (ref 0–7)
ERYTHROCYTE [DISTWIDTH] IN BLOOD BY AUTOMATED COUNT: 13.3 % (ref 11.5–14.5)
GLOBULIN SER CALC-MCNC: 3.1 G/DL (ref 2–4)
GLUCOSE SERPL-MCNC: 89 MG/DL (ref 65–100)
HCT VFR BLD AUTO: 40.8 % (ref 36.6–50.3)
HGB BLD-MCNC: 13.5 G/DL (ref 12.1–17)
IMM GRANULOCYTES # BLD AUTO: 0 K/UL (ref 0–0.04)
IMM GRANULOCYTES NFR BLD AUTO: 0 % (ref 0–0.5)
LYMPHOCYTES # BLD: 2.2 K/UL (ref 0.8–3.5)
LYMPHOCYTES NFR BLD: 23 % (ref 12–49)
MAGNESIUM SERPL-MCNC: 1.6 MG/DL (ref 1.6–2.4)
MCH RBC QN AUTO: 28.4 PG (ref 26–34)
MCHC RBC AUTO-ENTMCNC: 33.1 G/DL (ref 30–36.5)
MCV RBC AUTO: 85.7 FL (ref 80–99)
MONOCYTES # BLD: 1 K/UL (ref 0–1)
MONOCYTES NFR BLD: 10 % (ref 5–13)
NEUTS SEG # BLD: 6 K/UL (ref 1.8–8)
NEUTS SEG NFR BLD: 62 % (ref 32–75)
NRBC # BLD: 0 K/UL (ref 0–0.01)
NRBC BLD-RTO: 0 PER 100 WBC
P-R INTERVAL, ECG05: 164 MS
PHOSPHATE SERPL-MCNC: 3.3 MG/DL (ref 2.6–4.7)
PLATELET # BLD AUTO: 147 K/UL (ref 150–400)
PMV BLD AUTO: 10.2 FL (ref 8.9–12.9)
POTASSIUM SERPL-SCNC: 4 MMOL/L (ref 3.5–5.1)
PROT SERPL-MCNC: 6.2 G/DL (ref 6.4–8.2)
Q-T INTERVAL, ECG07: 368 MS
QRS DURATION, ECG06: 88 MS
QTC CALCULATION (BEZET), ECG08: 408 MS
RBC # BLD AUTO: 4.76 M/UL (ref 4.1–5.7)
SODIUM SERPL-SCNC: 137 MMOL/L (ref 136–145)
VENTRICULAR RATE, ECG03: 74 BPM
WBC # BLD AUTO: 9.6 K/UL (ref 4.1–11.1)

## 2020-02-05 PROCEDURE — 74011000250 HC RX REV CODE- 250: Performed by: INTERNAL MEDICINE

## 2020-02-05 PROCEDURE — 85025 COMPLETE CBC W/AUTO DIFF WBC: CPT

## 2020-02-05 PROCEDURE — 74011250637 HC RX REV CODE- 250/637: Performed by: INTERNAL MEDICINE

## 2020-02-05 PROCEDURE — 74011250636 HC RX REV CODE- 250/636: Performed by: INTERNAL MEDICINE

## 2020-02-05 PROCEDURE — 83735 ASSAY OF MAGNESIUM: CPT

## 2020-02-05 PROCEDURE — 36415 COLL VENOUS BLD VENIPUNCTURE: CPT

## 2020-02-05 PROCEDURE — 65270000029 HC RM PRIVATE

## 2020-02-05 PROCEDURE — 74011250637 HC RX REV CODE- 250/637: Performed by: PSYCHIATRY & NEUROLOGY

## 2020-02-05 PROCEDURE — 84100 ASSAY OF PHOSPHORUS: CPT

## 2020-02-05 PROCEDURE — 80053 COMPREHEN METABOLIC PANEL: CPT

## 2020-02-05 PROCEDURE — 82550 ASSAY OF CK (CPK): CPT

## 2020-02-05 PROCEDURE — 77030041076 HC DRSG AG OPTICELL MDII -A

## 2020-02-05 RX ORDER — GUAIFENESIN 100 MG/5ML
200 SOLUTION ORAL
Status: DISCONTINUED | OUTPATIENT
Start: 2020-02-05 | End: 2020-02-07 | Stop reason: HOSPADM

## 2020-02-05 RX ORDER — DIAZEPAM 2 MG/1
5 TABLET ORAL 3 TIMES DAILY
Status: DISCONTINUED | OUTPATIENT
Start: 2020-02-05 | End: 2020-02-06

## 2020-02-05 RX ORDER — FOLIC ACID 1 MG/1
1 TABLET ORAL DAILY
Status: DISCONTINUED | OUTPATIENT
Start: 2020-02-05 | End: 2020-02-07 | Stop reason: HOSPADM

## 2020-02-05 RX ORDER — DIAZEPAM 5 MG/1
10 TABLET ORAL 3 TIMES DAILY
Status: DISCONTINUED | OUTPATIENT
Start: 2020-02-05 | End: 2020-02-05

## 2020-02-05 RX ORDER — QUETIAPINE FUMARATE 25 MG/1
50 TABLET, FILM COATED ORAL
Status: DISCONTINUED | OUTPATIENT
Start: 2020-02-05 | End: 2020-02-06

## 2020-02-05 RX ADMIN — Medication 10 ML: at 05:05

## 2020-02-05 RX ADMIN — GUAIFENESIN 200 MG: 200 SOLUTION ORAL at 22:31

## 2020-02-05 RX ADMIN — DIAZEPAM 5 MG: 2 TABLET ORAL at 22:20

## 2020-02-05 RX ADMIN — LORAZEPAM 2 MG: 2 INJECTION INTRAMUSCULAR; INTRAVENOUS at 02:35

## 2020-02-05 RX ADMIN — Medication 100 MG: at 09:39

## 2020-02-05 RX ADMIN — FOLIC ACID 1 MG: 1 TABLET ORAL at 18:13

## 2020-02-05 RX ADMIN — FOLIC ACID: 5 INJECTION, SOLUTION INTRAMUSCULAR; INTRAVENOUS; SUBCUTANEOUS at 08:10

## 2020-02-05 RX ADMIN — FOLIC ACID: 5 INJECTION, SOLUTION INTRAMUSCULAR; INTRAVENOUS; SUBCUTANEOUS at 00:04

## 2020-02-05 RX ADMIN — Medication 10 ML: at 13:55

## 2020-02-05 RX ADMIN — FOLIC ACID: 5 INJECTION, SOLUTION INTRAMUSCULAR; INTRAVENOUS; SUBCUTANEOUS at 15:46

## 2020-02-05 RX ADMIN — FOLIC ACID: 5 INJECTION, SOLUTION INTRAMUSCULAR; INTRAVENOUS; SUBCUTANEOUS at 22:59

## 2020-02-05 RX ADMIN — DIAZEPAM 10 MG: 5 TABLET ORAL at 14:01

## 2020-02-05 RX ADMIN — DIAZEPAM 5 MG: 5 INJECTION, SOLUTION INTRAMUSCULAR; INTRAVENOUS at 05:06

## 2020-02-05 RX ADMIN — ENOXAPARIN SODIUM 40 MG: 40 INJECTION SUBCUTANEOUS at 22:23

## 2020-02-05 RX ADMIN — MULTIPLE VITAMINS W/ MINERALS TAB 1 TABLET: TAB at 09:39

## 2020-02-05 NOTE — PROGRESS NOTES
0800hrs:  Bedside and Verbal shift change report given to Red Lowe RN (oncoming nurse) by Corey Olguin RN (offgoing nurse). Report included the following information SBAR, Kardex, Intake/Output, MAR, Recent Results, Med Rec Status and Cardiac Rhythm NSR/SB 50-70's.     0805hrs:  Morning assessment completed. Patient heavily drowsy but arrousable. Resps within normal range, SpO2 98% on room air. Sitter at bedside, CIWA=1--will continue to monitor throughout shift. 0946hrs:  CIWA =3.  Patient upright in bed eating meal with assistance of sitter. Patient asked about \"the older lady with gray hair that just came in here. \"  Per sitter, there hasn't been anyone in the room. Will continue to monitor patient for withdrawal and agitation. 1040hrs:  Patient resting comfortably in bed. RN spoke with attending regarding patient placement and pending psychiatry consult. Transfer orders placed as patient does not need to be on a cardiac unit at this time. 1237hrs:  CIWA =5. Patient restless, somewhat anxious, and agitated. Patient said \"I'm ready to get out of here. \"  Patient easily distracted and is now resting. Will continue to monitor. 1636hrs:  TRANSFER - OUT REPORT:  Verbal report given to Yovani David RN(name) on Luz Never  being transferred to Room 577(unit) for routine progression of care     Report consisted of patients Situation, Background, Assessment and   Recommendations(SBAR). Information from the following report(s) SBAR, Kardex, Intake/Output, MAR, Recent Results, Med Rec Status and Cardiac Rhythm SB/NSR 50-60's was reviewed with the receiving nurse. Lines:   Peripheral IV 02/05/20 Anterior;Right Forearm (Active)   Phlebitis Assessment 0 2/5/2020  8:05 AM   Infiltration Assessment 0 2/5/2020  8:05 AM   Dressing Status Clean, dry, & intact 2/5/2020  8:05 AM   Dressing Type Transparent;Tape 2/5/2020  8:05 AM   Hub Color/Line Status Blue; Infusing 2/5/2020  1:54 PM   Action Taken Open ports on tubing capped 2/5/2020  8:05 AM   Alcohol Cap Used Yes 2/5/2020  8:05 AM      Opportunity for questions and clarification was provided.     Patient transported with:   Registered Nurse  Tech Detail Level: Simple Additional Notes: Discussed [employer] Products, tretinoin, products that exfoliate skin and Adapalene. SPF QD and reapply

## 2020-02-05 NOTE — PROGRESS NOTES
Hospitalist Progress Note  Veto Parsons MD  Answering service: [phone] from in house phone      Date of Service:  2020  NAME:  eJremiah Demarco  :  1980  MRN:  [id number]      Admission Summary:   Mr. Namita rFaga is a 40y/o with polysubstance abuse, who presented to the ED with aphasia, increased solmnolence and weakness, potential overdose vs intoxication. Troponin was elevated on admission, and EKG with some concern for STEMI, so code was called. He was intubated in the emergency room, reportedly in preparation for LHC due to concern for NSTEMI. UDS on admission was positive for cocaine, amphetamines, and opiates. EKG on admission showed Brugada's pattern type I. LHC was done on  on admission, normal coronaries and normal EF. Initial EKG findings were likely secondary to Brugada pattern. After his catheterization CK level was checked and was found to be elevated (< 35,000). Likely elevated troponin was a function of his severe rhabdomyolysis. Nevertheless due to a Brugada pattern, we do need to avoid QT prolonging drugs. He was extubated on , while he was intubated he was on propofol and Precedex, and these have since been withdrawn. Due to his agitation, and unfortunately unable to be administer any antipsychotics, he has been on scheduled diazepam with PRN IV Ativan. Interval history / Subjective:      Patient seen and examined in the morning. According to the sitter patient was awake, talking and eat breakfast. Per nursing report he was even saying about going home. But during my visit patient was very sleepy. He was half awake and going to sleepy without finishing the sentence. He has received IV valium few min before my visit.     Assessment & Plan:     # Alcohol withdrawal -unclear last drink   - CIWA protocol,   - on scheduled valium- changed to oral and PRN IV Ativan as needed  - ct with thiamine, folic acid and multivit      # Acute rhabdomyolysis  - CK over 39,000 on admission, downtrending  - CK daily   - Continue IVF at 150cc/hr     # NOLAN stage 2 - Cr up to 1.9, from normal baseline, now improved. - Secondary to rhabdomyolysis   - I and O   - Continue IVF    # Elevated troponin - secondary to elevated CK  - s/p [facility] 02/02 with negative CAD, normal EF  - Cardiology following, have signed off     # Brugada pattern on EKG  - Some reports of Brugada occurring after cocaine intoxication.  - Repeat EKG today     # Polysubstance abuse with intoxication and ?overdose- UDS + cocaine, methamphetamine, cocaine  - Monitor   - IV ativan as needed  - Cannot administer antipsychotics due to Brugada syndrome  - Consult inpatient psychiatry        # Elevated LFTs - unclear etiology, suspect secondary to hypotension and shock vs. Alcohol abuse   - hepatitis panel negative  - Downtrending, continue to follow       Expected LOS - 3-5 days depending on CIWA scoring  FULL CODE     DVT prop: Enoxaparin   Dispo: [address]  Never Reviewed          Codes Class Noted POA    Altered mental status ICD-10-CM: R41.82  ICD-9-CM: 780.97  2/2/2020 Unknown                Review of Systems:       Vital Signs:    Last 24hrs VS reviewed since prior progress note. Most recent are:  Visit Vitals  /83 (BP 1 Location: Left arm, BP Patient Position: At rest)   Pulse (!) 59   Temp 98.2 °F (36.8 °C)   Resp 20   Wt 82.1 kg (181 lb)   SpO2 98%   BMI 24.55 kg/m²         Intake/Output Summary (Last 24 hours) at 2/5/2020 1033  Last data filed at 2/5/2020 1030  Gross per 24 hour   Intake 3588 ml   Output 2535 ml   Net 1053 ml        Physical Examination:             Constitutional:  No acute distress, sleepy    ENT:  Oral mucous moist, oropharynx benign. Neck supple,    Resp:  CTA bilaterally. No wheezing/rhonchi/rales.  No accessory muscle use   CV:  Regular rhythm, normal rate, no murmurs, gallops, rubs    GI:  Soft, non distended, non tender. normoactive bowel sounds, no hepatosplenomegaly     Musculoskeletal:  No edema, warm, 2+ pulses throughout    Neurologic:  Moves all extremities. AAOx3, CN II-XII reviewed           Data Review:   I personally reviewed labs and imaging     Labs:     Recent Labs     02/05/20 0247 02/04/20 0342   WBC 9.6 10.1   HGB 13.5 13.1   HCT 40.8 37.9   * 150     Recent Labs     02/05/20 0247 02/04/20 0342 02/03/20 2242 02/03/20 0359 02/02/20  1802    139 139 135*   < >  --    K 4.0 3.8 4.0 4.3   < >  --     106 104 104   < >  --    CO2 29 28 26 27   < >  --    BUN 10 14 15 25*   < >  --    CREA 0.87 0.96 1.02 1.55*   < >  --    GLU 89 77 82 97   < >  --    CA 8.5 8.3* 8.7 8.6   < >  --    MG 1.6  --   --  1.7  --  1.5*   PHOS 3.3  --   --  1.5*  --  1.8*    < > = values in this interval not displayed. Recent Labs     02/05/20 0247 02/04/20 0342 02/03/20  0359   SGOT 684* 1,275* >2,000*   * 1,124* 1,484*   AP 73 75 79   TBILI 0.8 0.7 0.5   TP 6.2* 6.2* 6.2*   ALB 3.1* 3.1* 3.3*   GLOB 3.1 3.1 2.9     Recent Labs     02/02/20  1249   INR 1.4*   PTP 14.4*      No results for input(s): FE, TIBC, PSAT, FERR in the last 72 hours. No results found for: FOL, RBCF   No results for input(s): PH, PCO2, PO2 in the last 72 hours.   Recent Labs     02/05/20 0247 02/04/20 0342 02/03/20  2242   CPK 10,435* 20,658* 20,008*     No results found for: CHOL, CHOLX, CHLST, CHOLV, HDL, HDLP, LDL, LDLC, DLDLP, TGLX, TRIGL, TRIGP, CHHD, CHHDX  Lab Results   Component Value Date/Time    Glucose (POC) 84 02/04/2020 06:47 AM     Lab Results   Component Value Date/Time    Color DARK YELLOW 02/02/2020 09:02 AM    Appearance TURBID (A) 02/02/2020 09:02 AM    Specific gravity 1.020 02/02/2020 09:02 AM    pH (UA) 5.0 02/02/2020 09:02 AM    Protein 100 (A) 02/02/2020 09:02 AM    Glucose NEGATIVE  02/02/2020 09:02 AM    Ketone TRACE (A) 02/02/2020 09:02 AM    Bilirubin NEGATIVE  02/02/2020 09:02 AM    Urobilinogen 0.2 02/02/2020 09:02 AM    Nitrites NEGATIVE  02/02/2020 09:02 AM    Leukocyte Esterase TRACE (A) 02/02/2020 09:02 AM    Epithelial cells MODERATE (A) 02/02/2020 09:02 AM    Bacteria 1+ (A) 02/02/2020 09:02 AM    WBC 0-4 02/02/2020 09:02 AM    RBC 5-10 02/02/2020 09:02 AM         Medications Reviewed:     Current Facility-Administered Medications   Medication Dose Route Frequency    LORazepam (ATIVAN) injection 2 mg  2 mg IntraVENous Q1H PRN    LORazepam (ATIVAN) injection 4 mg  4 mg IntraVENous Q1H PRN    lactated Ringers 5,554 mL with folic acid 1 mg infusion   IntraVENous CONTINUOUS    thiamine HCL (B-1) tablet 100 mg  100 mg Oral DAILY    multivitamin, tx-iron-ca-min (THERA-M w/ IRON) tablet 1 Tab  1 Tab Oral DAILY    sodium chloride (NS) flush 5-40 mL  5-40 mL IntraVENous Q8H    sodium chloride (NS) flush 5-40 mL  5-40 mL IntraVENous PRN    enoxaparin (LOVENOX) injection 40 mg  40 mg SubCUTAneous Q24H    diazePAM (VALIUM) injection 5 mg  5 mg IntraVENous Q8H     ______________________________________________________________________  EXPECTED LENGTH OF STAY: - - -  ACTUAL LENGTH OF STAY:          3                 Michelle Juarez MD   Patient has given Verbal permission to discuss medical care with   persons present in the room and and also with contact as listed on face sheet.

## 2020-02-05 NOTE — PROGRESS NOTES
Problem: Falls - Risk of  Goal: *Absence of Falls  Description  Document Nasima Giuseppe Fall Risk and appropriate interventions in the flowsheet.   Outcome: Progressing Towards Goal  Note: Fall Risk Interventions:  Mobility Interventions: Communicate number of staff needed for ambulation/transfer, Patient to call before getting OOB, PT Consult for mobility concerns    Mentation Interventions: Adequate sleep, hydration, pain control, Door open when patient unattended, Family/sitter at bedside, More frequent rounding, Reorient patient, Room close to nurse's station, Toileting rounds    Medication Interventions: Evaluate medications/consider consulting pharmacy, Patient to call before getting OOB, Teach patient to arise slowly    Elimination Interventions: Call light in reach, Elevated toilet seat, Patient to call for help with toileting needs, Stay With Me (per policy), Toilet paper/wipes in reach, Toileting schedule/hourly rounds, Urinal in reach    History of Falls Interventions: Consult care management for discharge planning, Door open when patient unattended, Evaluate medications/consider consulting pharmacy, Investigate reason for fall, Room close to nurse's station, Utilize gait belt for transfer/ambulation

## 2020-02-05 NOTE — PROGRESS NOTES
TRANSFER - IN REPORT:    Verbal report received from Iron Cisneros RN(name) on Michelle Garnett  being received from [facility](unit) for routine progression of care      Report consisted of patients Situation, Background, Assessment and   Recommendations(SBAR). Information from the following report(s) SBAR, Kardex, ED Summary, Procedure Summary, Intake/Output, MAR and Recent Results was reviewed with the receiving nurse. Opportunity for questions and clarification was provided. Assessment completed upon patients arrival to unit and care assumed.

## 2020-02-06 LAB
ALBUMIN SERPL-MCNC: 2.8 G/DL (ref 3.5–5)
ALBUMIN/GLOB SERPL: 0.9 {RATIO} (ref 1.1–2.2)
ALP SERPL-CCNC: 67 U/L (ref 45–117)
ALT SERPL-CCNC: 565 U/L (ref 12–78)
ANION GAP SERPL CALC-SCNC: 9 MMOL/L (ref 5–15)
AST SERPL-CCNC: 260 U/L (ref 15–37)
BILIRUB SERPL-MCNC: 0.4 MG/DL (ref 0.2–1)
BUN SERPL-MCNC: 7 MG/DL (ref 6–20)
BUN/CREAT SERPL: 8 (ref 12–20)
CALCIUM SERPL-MCNC: 8.4 MG/DL (ref 8.5–10.1)
CHLORIDE SERPL-SCNC: 109 MMOL/L (ref 97–108)
CK SERPL-CCNC: 3156 U/L (ref 39–308)
CO2 SERPL-SCNC: 21 MMOL/L (ref 21–32)
CREAT SERPL-MCNC: 0.87 MG/DL (ref 0.7–1.3)
GLOBULIN SER CALC-MCNC: 3.2 G/DL (ref 2–4)
GLUCOSE SERPL-MCNC: 87 MG/DL (ref 65–100)
POTASSIUM SERPL-SCNC: 4.4 MMOL/L (ref 3.5–5.1)
PROT SERPL-MCNC: 6 G/DL (ref 6.4–8.2)
SODIUM SERPL-SCNC: 139 MMOL/L (ref 136–145)

## 2020-02-06 PROCEDURE — 36415 COLL VENOUS BLD VENIPUNCTURE: CPT

## 2020-02-06 PROCEDURE — 65270000029 HC RM PRIVATE

## 2020-02-06 PROCEDURE — 74011250637 HC RX REV CODE- 250/637: Performed by: HOSPITALIST

## 2020-02-06 PROCEDURE — 74011250637 HC RX REV CODE- 250/637: Performed by: INTERNAL MEDICINE

## 2020-02-06 PROCEDURE — 74011250636 HC RX REV CODE- 250/636: Performed by: INTERNAL MEDICINE

## 2020-02-06 PROCEDURE — 80053 COMPREHEN METABOLIC PANEL: CPT

## 2020-02-06 PROCEDURE — 82550 ASSAY OF CK (CPK): CPT

## 2020-02-06 PROCEDURE — 74011250636 HC RX REV CODE- 250/636: Performed by: HOSPITALIST

## 2020-02-06 PROCEDURE — 74011250637 HC RX REV CODE- 250/637: Performed by: PSYCHIATRY & NEUROLOGY

## 2020-02-06 RX ORDER — DIAZEPAM 5 MG/1
2.5 TABLET ORAL 2 TIMES DAILY
Status: DISCONTINUED | OUTPATIENT
Start: 2020-02-06 | End: 2020-02-06

## 2020-02-06 RX ORDER — SODIUM CHLORIDE, SODIUM LACTATE, POTASSIUM CHLORIDE, CALCIUM CHLORIDE 600; 310; 30; 20 MG/100ML; MG/100ML; MG/100ML; MG/100ML
75 INJECTION, SOLUTION INTRAVENOUS CONTINUOUS
Status: DISCONTINUED | OUTPATIENT
Start: 2020-02-06 | End: 2020-02-07 | Stop reason: HOSPADM

## 2020-02-06 RX ORDER — DIAZEPAM 5 MG/1
2.5 TABLET ORAL 3 TIMES DAILY
Status: DISCONTINUED | OUTPATIENT
Start: 2020-02-06 | End: 2020-02-07

## 2020-02-06 RX ADMIN — DIAZEPAM 2.5 MG: 5 TABLET ORAL at 16:21

## 2020-02-06 RX ADMIN — Medication 100 MG: at 10:01

## 2020-02-06 RX ADMIN — ENOXAPARIN SODIUM 40 MG: 40 INJECTION SUBCUTANEOUS at 22:56

## 2020-02-06 RX ADMIN — MULTIPLE VITAMINS W/ MINERALS TAB 1 TABLET: TAB at 10:01

## 2020-02-06 RX ADMIN — FOLIC ACID 1 MG: 1 TABLET ORAL at 10:02

## 2020-02-06 RX ADMIN — Medication 10 ML: at 16:21

## 2020-02-06 RX ADMIN — GUAIFENESIN 200 MG: 200 SOLUTION ORAL at 04:43

## 2020-02-06 RX ADMIN — SODIUM CHLORIDE, SODIUM LACTATE, POTASSIUM CHLORIDE, AND CALCIUM CHLORIDE 75 ML/HR: 600; 310; 30; 20 INJECTION, SOLUTION INTRAVENOUS at 16:26

## 2020-02-06 RX ADMIN — DIAZEPAM 5 MG: 2 TABLET ORAL at 10:01

## 2020-02-06 RX ADMIN — GUAIFENESIN 200 MG: 200 SOLUTION ORAL at 22:55

## 2020-02-06 RX ADMIN — DIAZEPAM 2.5 MG: 5 TABLET ORAL at 22:56

## 2020-02-06 NOTE — BH NOTES
[address]    Name:  Lily Whyte  MR#:  [id number]  :  1980  ACCOUNT #:  [de-identified]  DATE OF SERVICE:    CHIEF COMPLAINT:  \"I think I'm okay. \"    HISTORY OF PRESENT ILLNESS:  The patient is a 42-year-old [facility] American male, who is currently admitted with a history of being brought to the ED with aphasia, altered mental status and elevated troponins. There is a history of polysubstance abuse and his urine was positive for cocaine, methamphetamine and opiates. He had to be intubated, developed acute renal failure as well as severely elevated transferases and CK of 26,105. He had to be intubated, but has been extubated and appears to be significantly better from a medical point of view. I was consulted because of agitation and possible help with his substance abuse. When I interviewed the patient, he was sitting up in bed comfortably and was smiling. Says he feels good today. He appears to have some memory deficits, but when asked about it, he denied them. According to the one to one observer taking care of him, he has been asking to be discharged because he feels like his treatment is done and he wants to go. He reports that he uses about 1 g of cocaine every 3-4 days and was drinking heavily on occasion, but denies daily or heavy drinking on a regular basis. States that he used prescription opiates which he got from a friend recently and he uses them once or twice a week. Denies depressed mood or neurovegetative symptoms of depression. Nursing staff have reported that he was having visual hallucinations of a woman sitting in his room, but he denies this and one to one observer has not noticed this. He says he is not interested in getting treatment for his substance abuse at the present time and wants to go out and use again.   I discussed the severity of adverse events from cocaine that he was experiencing, but he is not interested in talking about this at present. Denies any command auditory hallucinations. Denies suicidal ideation or plan. PAST MEDICAL HISTORY:  Reviewed as per the history and physical exam.      History reviewed. No pertinent past medical history. Prior to Admission medications    Medication Sig Start Date End Date Taking? Authorizing Provider   ondansetron (ZOFRAN ODT) 4 mg disintegrating tablet Take 1 Tab by mouth every eight (8) hours as needed for Nausea. 3/21/18   Tapan Hernandez NP     Vitals:    02/05/20 1715 02/05/20 2038 02/06/20 0433 02/06/20 0850   BP: (!) 145/93 119/76 107/73 131/88   Pulse: 63 71 69 64   Resp: 18 16 16 16   Temp: 99 °F (37.2 °C) 98.1 °F (36.7 °C) 98.3 °F (36.8 °C) 98.9 °F (37.2 °C)   SpO2: 99% 97% 96% 100%   Weight:         Lab Results   Component Value Date/Time    WBC 9.6 02/05/2020 02:47 AM    HGB 13.5 02/05/2020 02:47 AM    HCT 40.8 02/05/2020 02:47 AM    PLATELET 061 (L) 27/44/2258 02:47 AM    MCV 85.7 02/05/2020 02:47 AM     Lab Results   Component Value Date/Time    Sodium 137 02/05/2020 02:47 AM    Potassium 4.0 02/05/2020 02:47 AM    Chloride 103 02/05/2020 02:47 AM    CO2 29 02/05/2020 02:47 AM    Anion gap 5 02/05/2020 02:47 AM    Glucose 89 02/05/2020 02:47 AM    BUN 10 02/05/2020 02:47 AM    Creatinine 0.87 02/05/2020 02:47 AM    BUN/Creatinine ratio 11 (L) 02/05/2020 02:47 AM    GFR est AA >60 02/05/2020 02:47 AM    GFR est non-AA >60 02/05/2020 02:47 AM    Calcium 8.5 02/05/2020 02:47 AM    Bilirubin, total 0.8 02/05/2020 02:47 AM    AST (SGOT) 684 (H) 02/05/2020 02:47 AM    Alk.  phosphatase 73 02/05/2020 02:47 AM    Protein, total 6.2 (L) 02/05/2020 02:47 AM    Albumin 3.1 (L) 02/05/2020 02:47 AM    Globulin 3.1 02/05/2020 02:47 AM    A-G Ratio 1.0 (L) 02/05/2020 02:47 AM    ALT (SGPT) 874 (H) 02/05/2020 02:47 AM     No results found for: VALF2, VALAC, VALP, VALPR, DS6, CRBAM, CRBAMP, CARB2, XCRBAM  No results found for: LITHM  RADIOLOGY REPORTS:(reviewed/updated 2/6/2020)  Ct Head Wo Cont    Result Date: 2/2/2020  INDICATION: ams, aphasia. Exam: Noncontrast CT of the brain is performed with 5 mm collimation. CT dose reduction was achieved with the use of the standardized protocol tailored for this examination and automatic exposure control for dose modulation. Adaptive statistical iterative reconstruction (ASIR) was utilized. FINDINGS: There is no acute intracranial hemorrhage, mass, mass effect or herniation. Ventricular system is normal. The gray-white matter differentiation is well-preserved. The mastoid air cells are well pneumatized. The visualized paranasal sinuses are normal.     IMPRESSION: No acute intracranial hemorrhage, mass or infarct. Us Abd Comp    Result Date: 2/2/2020  INDICATION: Elevated liver enzymes. Exam: Ultrasound of the abdomen. FINDINGS: Gallbladder: The gallbladder is normal. There is no gallbladder distention, pericholecystic fluid, sonographic Nunez's sign or gallbladder wall thickening. The common bile duct is within normal limits measuring 4 mm in diameter. Liver: The liver is normal in size and echotexture. The portal vein is patent and demonstrates appropriate directional hepatopedal flow. Main portal vein diameter is 9 mm. Portal vein velocity is 18.8 cm/s. Spleen: The spleen is normal in size and echotexture. Pancreas: Visualized portions of the pancreas are normal. Kidneys: The kidneys are normal in size echotexture. There is no hydronephrosis. Abdominal aorta: The abdominal aorta is normal in course and caliber. IVC: Visualized portions of the IVC are normal in appearance. IMPRESSION: No cholelithiasis or evidence of acute cholecystitis. Xr Chest Port    Result Date: 2/3/2020  EXAM: XR CHEST PORT INDICATION: ET Tube Placement COMPARISON: February 2, 2020 FINDINGS: A portable AP radiograph of the chest was obtained at 0336 hours. The patient is on a cardiac monitor. The ET tube is in satisfactory position.  The heart size is normal. The lungs are clear. No pneumothorax. IMPRESSION: 1. The ET tube is in satisfactory position    Xr Chest [facility]    Result Date: 2/2/2020  *  INDICATION: Tube placement. Portable AP semiupright view of the chest. Direct comparison made to prior chest x-ray dated February 2, 2020. Cardiomediastinal silhouette is stable. ET tube tip is 4.4 cm superior to level of the cheng. Lungs are clear bilaterally. No pleural fluid is visualized. There is no pneumothorax. IMPRESSION: ET tube in appropriate position. Clear lungs. Xr Chest Port    Result Date: 2/2/2020  INDICATION: Aphasia. STEMI. Portable AP semiupright view of the chest. Direct comparison made to prior chest x-ray dated March 2018. Cardiomediastinal silhouette is stable. Lungs are clear bilaterally. Pleural spaces are normal. Osseous structures are intact. IMPRESSION: No acute cardiopulmonary disease. PAST PSYCHIATRIC HISTORY:  She denies any prior history of psychiatric hospitalizations or detox admissions. States that he has been using cocaine since the age of 25 years and it is his drug of choice. Denies any history of withdrawal seizures or psychotic symptoms from his substance use. PSYCHOSOCIAL HISTORY:  The patient currently lives in [place] with his mother. States that he has never been  and does not have any children. 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 Currently he is not in a relationship. Denies any major legal stressors. MENTAL STATUS EXAM:  The patient is a young [facility] American man, who is dressed in hospital apparel. He is calm, cooperative and sitting up in bed. Smiles with eye contact. His affect is mildly constricted and mood is reported as being good. Speech is spontaneous and coherent. Denies any active suicidal ideation or plan. Denies any perceptual abnormalities. Denies any delusions. His thought process is mildly disorganized at times.   Cognitively, he is awake and alert although somewhat confused. He notes that he is at [facility], but could not state the floor. He knows the day of the week, the month and the year, but was unable to tell me the date. He struggled with delayed recall and his recent memory is impaired. Intelligence is average and fund of knowledge is adequate. Insight is poor. Judgment is poor. ASSESSMENT/PLAN:  Diagnosis at the present time, the patient appears to be suffering from a state of delirium unspecified, which may be multifactorial in origin. He was not drinking heavily enough to be withdrawing from alcohol and urine was positive only for cocaine and methamphetamines. He has minimized the usage of opiates and is possible he is withdrawing from opiates. His EKG appears to have cleared up today and his QTC was normal.  At the present time, I would recommend reducing his diazepam dosage to 5 mg t.i.d. and taping and stopping it all over the next 2 or 3 days. I started him on Seroquel 50 mg, which can be taken every 6 hours p.r.n. as needed for agitation. If he gets severely agitated, then he can be given haloperidol 5 mg q. 6h. p.r.n. IM or IV. He is not interested in any treatment for his substance abuse currently, and a referral can be made to the local community service board when he leaves the hospital.  Please continue his one to one observation for his confusion. Thank you for your consult.       MD VENECIA [email or web address]  D:  02/05/2020 15:45  T:  02/05/2020 19:37  JOB #:  [id number]

## 2020-02-06 NOTE — PROGRESS NOTES
Hospitalist Progress Note  Lillian Cortez MD  Answering service: [phone] from in house phone      Date of Service:  2020  NAME:  Juliano Shoemaker  :  1980  MRN:  [id number]      Admission Summary:   Mr. Haylee Yanez is a 38y/o with polysubstance abuse, who presented to the ED with aphasia, increased solmnolence and weakness, potential overdose vs intoxication. Troponin was elevated on admission, and EKG with some concern for STEMI, so code was called. He was intubated in the emergency room, reportedly in preparation for LHC. UDS on admission was positive for cocaine, amphetamines, and opiates. EKG on admission showed Brugada's pattern type I. LHC was done on  on admission, normal coronaries and normal EF. Initial EKG findings were likely secondary to Brugada pattern. After his catheterization CK level was checked and was found to be elevated (< 35,000). Likely elevated troponin was a function of his severe rhabdomyolysis. Nevertheless due to a Brugada pattern, we do need to avoid QT prolonging drugs. He was extubated on , while he was intubated he was on propofol and Precedex, and these have since been withdrawn. Due to his agitation, and unfortunately unable to be administer any antipsychotics, he has been on scheduled diazepam with PRN IV Ativan. Interval history / Subjective:      Patient seen and examined today. Sitter at bedside. Per sitter, patient woke up in the morning and had breakfast.    During my evaluation, patient was asking when he would go home. He denied any headache, nausea/vomiting, shortness of breath, chest pain.     Assessment & Plan:     #Acute encephalopathy-resolved  -Suspected due to alcohol and polysubstance abuse(UDS positive for amphetamines, cocaine and opiates at the time of admission)    # Acute rhabdomyolysis  - CK over 39,000 on admission, down trending -CK today 3000.  -Decrease the rate of LR to 75 cc/h. #Elevated LFTs/transaminitis-improving  -related to rhabdomyolysis. -Hepatitis panel was negative and ultrasound of the liver did not show any abnormalities. #Substance abuse -UDS positive for cocaine, amphetamine and opiates at the time of admission. Also reported alcoholism  -Patient was on scheduled Valium 5 mg 3 times daily-decrease the dose to 2.5 mg and wean off in the next 1 or 2 days per psychiatry recommendations  -Psychiatry was consulted. # Brugada pattern on EKG:  #Non-MI related troponin elevation  ? Elevated troponin and EKG changes at the time of admission likely related to polysubstance abuse and rhabdomyolysis  Cardiac catheter this admission showed no coronary artery disease and normal EF.   -Avoid QTC prolonging drugs. # NOLAN-resolved  - Secondary to rhabdomyolysis     FULL CODE     DVT prop: Enoxaparin   Dispo: Home      Hospital Problems  Never Reviewed          Codes Class Noted POA    Altered mental status ICD-10-CM: R41.82  ICD-9-CM: 780.97  2/2/2020 Unknown                Review of Systems:   As per HPI    Vital Signs:    Last 24hrs VS reviewed since prior progress note. Most recent are:  Visit Vitals  BP 99/53 (BP 1 Location: Left arm, BP Patient Position: At rest)   Pulse 64   Temp 98.4 °F (36.9 °C)   Resp 16   Wt 82.1 kg (181 lb)   SpO2 100%   BMI 24.55 kg/m²         Intake/Output Summary (Last 24 hours) at 2/6/2020 1726  Last data filed at 2/6/2020 0529  Gross per 24 hour   Intake --   Output 900 ml   Net -900 ml        Physical Examination:             Constitutional:  No acute distress   ENT:  Oral mucous moist, oropharynx benign. Neck supple, EOMI, anicteric sclera   Resp:  CTA bilaterally. No wheezing/rhonchi/rales. No accessory muscle use   CV:  Regular rhythm, normal rate, no murmurs    GI:  Soft, non distended, non tender.  normoactive bowel sounds    Musculoskeletal:  No lower extremity edema    Neurologic:  Moves all extremities. AAOx3           Data Review:   I personally reviewed labs and imaging results    Labs:     Recent Labs     02/05/20 0247 02/04/20  0342   WBC 9.6 10.1   HGB 13.5 13.1   HCT 40.8 37.9   * 150     Recent Labs     02/05/20  0247 02/04/20  0342 02/03/20  2242    139 139   K 4.0 3.8 4.0    106 104   CO2 29 28 26   BUN 10 14 15   CREA 0.87 0.96 1.02   GLU 89 77 82   CA 8.5 8.3* 8.7   MG 1.6  --   --    PHOS 3.3  --   --      Recent Labs     02/05/20 0247 02/04/20 0342   SGOT 684* 1,275*   * 1,124*   AP 73 75   TBILI 0.8 0.7   TP 6.2* 6.2*   ALB 3.1* 3.1*   GLOB 3.1 3.1     No results for input(s): INR, PTP, APTT, INREXT, INREXT in the last 72 hours. No results for input(s): FE, TIBC, PSAT, FERR in the last 72 hours. No results found for: FOL, RBCF   No results for input(s): PH, PCO2, PO2 in the last 72 hours.   Recent Labs     02/06/20  0453 02/05/20 0247 02/04/20 0342   CPK 3,156* 10,435* 20,658*     No results found for: CHOL, CHOLX, CHLST, CHOLV, HDL, HDLP, LDL, LDLC, DLDLP, TGLX, TRIGL, TRIGP, CHHD, CHHDX  Lab Results   Component Value Date/Time    Glucose (POC) 84 02/04/2020 06:47 AM     Lab Results   Component Value Date/Time    Color DARK YELLOW 02/02/2020 09:02 AM    Appearance TURBID (A) 02/02/2020 09:02 AM    Specific gravity 1.020 02/02/2020 09:02 AM    pH (UA) 5.0 02/02/2020 09:02 AM    Protein 100 (A) 02/02/2020 09:02 AM    Glucose NEGATIVE  02/02/2020 09:02 AM    Ketone TRACE (A) 02/02/2020 09:02 AM    Bilirubin NEGATIVE  02/02/2020 09:02 AM    Urobilinogen 0.2 02/02/2020 09:02 AM    Nitrites NEGATIVE  02/02/2020 09:02 AM    Leukocyte Esterase TRACE (A) 02/02/2020 09:02 AM    Epithelial cells MODERATE (A) 02/02/2020 09:02 AM    Bacteria 1+ (A) 02/02/2020 09:02 AM    WBC 0-4 02/02/2020 09:02 AM    RBC 5-10 02/02/2020 09:02 AM         Medications Reviewed:     Current Facility-Administered Medications   Medication Dose Route Frequency    diazePAM (VALIUM) tablet 2.5 mg  2.5 mg Oral TID    lactated Ringers infusion  75 mL/hr IntraVENous CONTINUOUS    QUEtiapine (SEROquel) tablet 50 mg  50 mg Oral H8L PRN    folic acid (FOLVITE) tablet 1 mg  1 mg Oral DAILY    guaiFENesin (ROBITUSSIN) 100 mg/5 mL oral liquid 200 mg  200 mg Oral Q4H PRN    LORazepam (ATIVAN) injection 2 mg  2 mg IntraVENous Q1H PRN    thiamine HCL (B-1) tablet 100 mg  100 mg Oral DAILY    multivitamin, tx-iron-ca-min (THERA-M w/ IRON) tablet 1 Tab  1 Tab Oral DAILY    sodium chloride (NS) flush 5-40 mL  5-40 mL IntraVENous Q8H    sodium chloride (NS) flush 5-40 mL  5-40 mL IntraVENous PRN    enoxaparin (LOVENOX) injection 40 mg  40 mg SubCUTAneous Q24H     ______________________________________________________________________  EXPECTED LENGTH OF STAY: - - -  ACTUAL LENGTH OF STAY:          4                 Jere Libman, MD

## 2020-02-06 NOTE — ROUTINE PROCESS
2137: Paged on call hospitalist.     2140: Dr. Luz Maria Mcfadden notified pt stated he has a cold and had been coughing all day, requesting Robitussin. Telephone order given for Robitussin 10 mls every 4 hours prn for cough.

## 2020-02-06 NOTE — PROGRESS NOTES
Bedside and Verbal shift change report given to [place], RN (oncoming nurse) by Lynette Justin RN (offgoing nurse). Report included the following information SBAR, ED Summary, Procedure Summary, Intake/Output, MAR and Recent Results.

## 2020-02-06 NOTE — ROUTINE PROCESS
Bedside shift change report given to MAITE Bar (oncoming nurse) by Elena Montanez RN(offgoing nurse). Report given with SBAR.

## 2020-02-07 VITALS
SYSTOLIC BLOOD PRESSURE: 112 MMHG | HEART RATE: 69 BPM | DIASTOLIC BLOOD PRESSURE: 78 MMHG | WEIGHT: 181 LBS | BODY MASS INDEX: 24.55 KG/M2 | RESPIRATION RATE: 12 BRPM | TEMPERATURE: 98.4 F | OXYGEN SATURATION: 99 %

## 2020-02-07 LAB — CK SERPL-CCNC: 1463 U/L (ref 39–308)

## 2020-02-07 PROCEDURE — 74011250637 HC RX REV CODE- 250/637: Performed by: INTERNAL MEDICINE

## 2020-02-07 PROCEDURE — 82550 ASSAY OF CK (CPK): CPT

## 2020-02-07 PROCEDURE — 74011250637 HC RX REV CODE- 250/637: Performed by: HOSPITALIST

## 2020-02-07 PROCEDURE — 36415 COLL VENOUS BLD VENIPUNCTURE: CPT

## 2020-02-07 RX ORDER — DIAZEPAM 2 MG/1
2 TABLET ORAL 2 TIMES DAILY
Status: DISCONTINUED | OUTPATIENT
Start: 2020-02-07 | End: 2020-02-07 | Stop reason: HOSPADM

## 2020-02-07 RX ORDER — DIAZEPAM 2 MG/1
1 TABLET ORAL 2 TIMES DAILY
Qty: 1 TAB | Refills: 0 | Status: SHIPPED | OUTPATIENT
Start: 2020-02-07 | End: 2020-02-08

## 2020-02-07 RX ADMIN — DIAZEPAM 2 MG: 2 TABLET ORAL at 09:54

## 2020-02-07 RX ADMIN — FOLIC ACID 1 MG: 1 TABLET ORAL at 09:54

## 2020-02-07 RX ADMIN — MULTIPLE VITAMINS W/ MINERALS TAB 1 TABLET: TAB at 09:54

## 2020-02-07 RX ADMIN — Medication 100 MG: at 09:55

## 2020-02-07 NOTE — PROGRESS NOTES
Bedside and Verbal shift change report given to Twin (oncoming nurse) by Clement Rodriguez (offgoing nurse). Report included the following information SBAR, Kardex, Intake/Output, MAR and Recent Results.

## 2020-02-07 NOTE — PROGRESS NOTES
Discussed with RN this morning, patient calm,cooperative and no signs of agitation - discontinued sitter this morning. Plan was to discharge the patient later in the day if remains stable without any issues. In the afternoon,patient left the hospital without notifying anyone and before I could evaluate to give the medications and discharge instructions. Called all the  Numbers listed in the face sheet, one of them was wrong number and other one was out of order.

## 2020-02-07 NOTE — DISCHARGE INSTRUCTIONS
Discharge Instructions       PATIENT ID: Sandhya Krueger  MRN: [id number]   YOB: 1980    DATE OF ADMISSION: 2/2/2020 10:27 AM    DATE OF DISCHARGE: 2/7/2020    PRIMARY CARE PROVIDER: None     ATTENDING PHYSICIAN: Epifanio Ceja MD  DISCHARGING PROVIDER: Arturo Brewer MD    To contact this individual call [phone] and ask the  to page. If unavailable ask to be transferred the Adult Hospitalist Department. DISCHARGE DIAGNOSES ***    CONSULTATIONS: IP CONSULT TO PSYCHOLOGY    PROCEDURES/SURGERIES: Procedure(s):  LEFT HEART CATH  CORONARY ANGIOGRAPHY    PENDING TEST RESULTS:   At the time of discharge the following test results are still pending: ***    FOLLOW UP APPOINTMENTS:   Follow-up Information     Follow up With Specialties Details Why Contact Sylvia Ness MD Cardiology  As needed  [address] [place] [address]  [phone]             ADDITIONAL CARE RECOMMENDATIONS:  -follow up with PCP  - if you have any episodes of syncope -sudden loss of consciousness or palpitations - you will need evaluation by cardiologist  - avoid alcohol or any other illicit drugs    DIET: Regular Diet      ACTIVITY: Activity as tolerated    WOUND CARE: na    EQUIPMENT needed: na      DISCHARGE MEDICATIONS:   See Medication Reconciliation Form    · It is important that you take the medication exactly as they are prescribed. · Keep your medication in the bottles provided by the pharmacist and keep a list of the medication names, dosages, and times to be taken in your wallet. · Do not take other medications without consulting your doctor. NOTIFY YOUR PHYSICIAN FOR ANY OF THE FOLLOWING:   Fever over 101 degrees for 24 hours. Chest pain, shortness of breath, fever, chills, nausea, vomiting, diarrhea, change in mentation, falling, weakness, bleeding. Severe pain or pain not relieved by medications.   Or, any other signs or symptoms that you may have questions about.       DISPOSITION:  X  Home With:   OT  PT  HH  RN       SNF/Inpatient Rehab/LTAC    Independent/assisted living    Hospice    Other:         Signed:   Kushal Rios MD  2/7/2020  2:16 PM

## 2020-02-08 NOTE — PROGRESS NOTES
Patient left hospital without discharge instructions or medications, informed MD. MD tried to call patient and patients family member, no one answered.

## 2020-02-10 NOTE — DISCHARGE SUMMARY
Discharge Summary       PATIENT ID: Oziel Sellers  MRN: [id number]   YOB: 1980    DATE OF ADMISSION: 2/2/2020 10:27 AM    DATE OF DISCHARGE: 2/7/2020   PRIMARY CARE PROVIDER: None     ATTENDING PHYSICIAN: Jocy Martell MD    DISCHARGING PROVIDER: Jocy Martell MD    To contact this individual call [phone] and ask the  to page. If unavailable ask to be transferred the Adult Hospitalist Department. CONSULTATIONS: None    PROCEDURES/SURGERIES: Procedure(s):  LEFT HEART CATH  CORONARY ANGIOGRAPHY    ADMITTING DIAGNOSES & HOSPITAL COURSE:     DISCHARGE DIAGNOSES / PLAN:    On the day of discharge -2/7/2020-  Discussed with RN 2/7 morning, patient calm,cooperative and no signs of agitation - discontinued sitter. In the afternoon,patient left the hospital without notifying anyone and before I could evaluate to give the medications and discharge instructions. Called all the  Numbers listed in the face sheet, one of them was wrong number and other one was out of order. 40y/o with polysubstance abuse, who presented to the ED with aphasia, increased solmnolence and weakness, potential overdose vs intoxication. Troponin was elevated on admission, and EKG with some concern for STEMI, so code was called.  He was intubated in the emergency room, reportedly in preparation for LHC. UDS on admission was positive for cocaine, amphetamines, and opiates.  . LHC was done on 02/02 on admission, normal coronaries and normal EF.  Initial EKG findings were likely secondary to Brugada pattern.  After his catheterization CK level was checked and was found to be elevated (< 35,000).  Likely elevated troponin was a function of his severe rhabdomyolysis vs cocaine.      #Acute encephalopathy-resolved  -Suspected due to alcohol and polysubstance abuse(UDS positive for amphetamines, cocaine and opiates at the time of admission)  -weaning valium everyday     # Acute rhabdomyolysis-improved  - CK over 39,000 on admission, down trending-3k       #Elevated LFTs/transaminitis-improving  -related to rhabdomyolysis. -Hepatitis panel was negative and ultrasound of the liver did not show any abnormalities.     #Substance abuse -UDS positive for cocaine, amphetamine and opiates at the time of admission. Also reported alcoholism  -valium dose was slowly decreased     # Brugada pattern on EKG:  #Non-MI related troponin elevation  ? Elevated troponin and EKG changes at the time of admission likely related to polysubstance abuse and rhabdomyolysis  Cardiac catheter this admission showed no coronary artery disease and normal EF.   -Avoid QTC prolonging drugs.     # NOLAN-resolved  - Secondary to rhabdomyolysis                PENDING TEST RESULTS:   At the time of discharge the following test results are still pending: none    FOLLOW UP APPOINTMENTS:    Follow-up Information     Follow up With Specialties Details Why Contact Info    Calin Evans MD Cardiology  As needed  [address]  [phone]               DISCHARGE MEDICATIONS:  Discharge Medication List as of 2/7/2020  3:11 PM      START taking these medications    Details   diazePAM (VALIUM) 2 mg tablet Take 0.5 Tabs by mouth two (2) times a day for 1 day. Max Daily Amount: 2 mg., Print, Disp-1 Tab, R-0         CONTINUE these medications which have NOT CHANGED    Details   ondansetron (ZOFRAN ODT) 4 mg disintegrating tablet Take 1 Tab by mouth every eight (8) hours as needed for Nausea. , Print, Disp-20 Tab, R-0                   DISPOSITION:  Patient left the hospital prior to evaluation and discharge instructions without informing anyone.       Signed:   Yaw Nunez MD  2/9/2020  8:33 PM

## 2020-02-21 ENCOUNTER — HOSPITAL ENCOUNTER (EMERGENCY)
Age: 40
Discharge: HOME | End: 2020-02-21
Attending: EMERGENCY MEDICINE | Admitting: EMERGENCY MEDICINE
Payer: MEDICAID

## 2020-02-21 VITALS
TEMPERATURE: 97.9 F | DIASTOLIC BLOOD PRESSURE: 77 MMHG | HEART RATE: 84 BPM | RESPIRATION RATE: 18 BRPM | OXYGEN SATURATION: 98 % | SYSTOLIC BLOOD PRESSURE: 135 MMHG

## 2020-02-21 DIAGNOSIS — R20.2 PARESTHESIA OF LEFT LEG: Primary | ICD-10-CM

## 2020-02-21 DIAGNOSIS — E83.42 HYPOMAGNESEMIA: ICD-10-CM

## 2020-02-21 DIAGNOSIS — M54.31 SCIATICA, RIGHT SIDE: ICD-10-CM

## 2020-02-21 LAB
ALBUMIN SERPL-MCNC: 3.4 G/DL (ref 3.5–5)
ALBUMIN/GLOB SERPL: 0.9 {RATIO} (ref 1.1–2.2)
ALP SERPL-CCNC: 80 U/L (ref 45–117)
ALT SERPL-CCNC: 33 U/L (ref 12–78)
ANION GAP SERPL CALC-SCNC: 2 MMOL/L (ref 5–15)
AST SERPL-CCNC: 13 U/L (ref 15–37)
BASOPHILS # BLD: 0.1 K/UL (ref 0–0.1)
BASOPHILS NFR BLD: 1 % (ref 0–1)
BILIRUB SERPL-MCNC: 0.2 MG/DL (ref 0.2–1)
BUN SERPL-MCNC: 8 MG/DL (ref 6–20)
BUN/CREAT SERPL: 9 (ref 12–20)
CALCIUM SERPL-MCNC: 9 MG/DL (ref 8.5–10.1)
CHLORIDE SERPL-SCNC: 109 MMOL/L (ref 97–108)
CK SERPL-CCNC: 95 U/L (ref 39–308)
CO2 SERPL-SCNC: 29 MMOL/L (ref 21–32)
COMMENT, HOLDF: NORMAL
CREAT SERPL-MCNC: 0.94 MG/DL (ref 0.7–1.3)
DIFFERENTIAL METHOD BLD: NORMAL
EOSINOPHIL # BLD: 0.4 K/UL (ref 0–0.4)
EOSINOPHIL NFR BLD: 5 % (ref 0–7)
ERYTHROCYTE [DISTWIDTH] IN BLOOD BY AUTOMATED COUNT: 14 % (ref 11.5–14.5)
GLOBULIN SER CALC-MCNC: 3.8 G/DL (ref 2–4)
GLUCOSE SERPL-MCNC: 99 MG/DL (ref 65–100)
HCT VFR BLD AUTO: 42 % (ref 36.6–50.3)
HGB BLD-MCNC: 13.8 G/DL (ref 12.1–17)
IMM GRANULOCYTES # BLD AUTO: 0 K/UL (ref 0–0.04)
IMM GRANULOCYTES NFR BLD AUTO: 0 % (ref 0–0.5)
LYMPHOCYTES # BLD: 2.1 K/UL (ref 0.8–3.5)
LYMPHOCYTES NFR BLD: 23 % (ref 12–49)
MAGNESIUM SERPL-MCNC: 1.5 MG/DL (ref 1.6–2.4)
MCH RBC QN AUTO: 28.7 PG (ref 26–34)
MCHC RBC AUTO-ENTMCNC: 32.9 G/DL (ref 30–36.5)
MCV RBC AUTO: 87.3 FL (ref 80–99)
MONOCYTES # BLD: 0.7 K/UL (ref 0–1)
MONOCYTES NFR BLD: 7 % (ref 5–13)
NEUTS SEG # BLD: 5.7 K/UL (ref 1.8–8)
NEUTS SEG NFR BLD: 64 % (ref 32–75)
NRBC # BLD: 0 K/UL (ref 0–0.01)
NRBC BLD-RTO: 0 PER 100 WBC
PLATELET # BLD AUTO: 247 K/UL (ref 150–400)
PMV BLD AUTO: 10 FL (ref 8.9–12.9)
POTASSIUM SERPL-SCNC: 4 MMOL/L (ref 3.5–5.1)
PROT SERPL-MCNC: 7.2 G/DL (ref 6.4–8.2)
RBC # BLD AUTO: 4.81 M/UL (ref 4.1–5.7)
SAMPLES BEING HELD,HOLD: NORMAL
SODIUM SERPL-SCNC: 140 MMOL/L (ref 136–145)
WBC # BLD AUTO: 8.9 K/UL (ref 4.1–11.1)

## 2020-02-21 PROCEDURE — 85025 COMPLETE CBC W/AUTO DIFF WBC: CPT

## 2020-02-21 PROCEDURE — 36415 COLL VENOUS BLD VENIPUNCTURE: CPT

## 2020-02-21 PROCEDURE — 80053 COMPREHEN METABOLIC PANEL: CPT

## 2020-02-21 PROCEDURE — 82550 ASSAY OF CK (CPK): CPT

## 2020-02-21 PROCEDURE — 83735 ASSAY OF MAGNESIUM: CPT

## 2020-02-21 PROCEDURE — 99282 EMERGENCY DEPT VISIT SF MDM: CPT

## 2020-02-21 NOTE — ED TRIAGE NOTES
Triage: Pt c/o R upper leg numbness for several weeks and R upper buttocks is sore and nodules to R neck pt has noticed. Pt denies any N/V/D or fevers.

## 2020-02-21 NOTE — ED PROVIDER NOTES
Ayden Gao is a 42 y.o. male with PMH of cocaine, amphetamine, THC and ETOH abuse (last ETOH 3 days ago, states last time using cocaine was 7 days ago) presents to emergency room ambulatory for evaluation of tingling to L thigh and R gluteal region x 2 weeks. No rash, fever, chills,   No back pain, saddle anesthesia, bowel/bladder incontinence. States sx's began on day of discharge on 02/07 when he left before paperwork given. Also sent here from [facility] due to having small mobile lymph nodes in R posterior cervical chain of SCM on the right posterior neck which has been present for 3 weeks, non-painful, #3 total. He denies weakness, vomiting, diarrhea, neck pain, chills, urinary sx's, flank pain. His ER admission on 69/8-30/4 was complicated by requiring intubation due to somnolence, drug and alcohol intoxication, EKG suggested brugada syndrome and also concerning for MI so went to cath lab, had negative cath, also in rhabdo, CK>35,000 but went down to 1,400 at time of discharge. He states he has been feeling fine but showered and felt some tingling to the left thigh that hasn't changed but hasn't returned to normal. Denies injecting in that site. Denies back pain, fever, chills. PCP: None    Surgical hx- see chart  Social hx- + tobacco, + ETOH, + cocaine, + amphetamines    The patient has no other complaints at this time. History reviewed. No pertinent past medical history. Past Surgical History:   Procedure Laterality Date    HX ORTHOPAEDIC      Right ankle sugery         History reviewed. No pertinent family history.     Social History     Socioeconomic History    Marital status: SINGLE     Spouse name: Not on file    Number of children: Not on file    Years of education: Not on file    Highest education level: Not on file   Occupational History    Not on file   Social Needs    Financial resource strain: Not on file    Food insecurity:     Worry: Not on file Inability: Not on file    Transportation needs:     Medical: Not on file     Non-medical: Not on file   Tobacco Use    Smoking status: Current Every Day Smoker   Substance and Sexual Activity    Alcohol use: Yes     Comment: social    Drug use: Not on file    Sexual activity: Not on file   Lifestyle    Physical activity:     Days per week: Not on file     Minutes per session: Not on file    Stress: Not on file   Relationships    Social connections:     Talks on phone: Not on file     Gets together: Not on file     Attends Spiritism service: Not on file     Active member of club or organization: Not on file     Attends meetings of clubs or organizations: Not on file     Relationship status: Not on file    Intimate partner violence:     Fear of current or ex partner: Not on file     Emotionally abused: Not on file     Physically abused: Not on file     Forced sexual activity: Not on file   Other Topics Concern    Not on file   Social History Narrative    Not on file         ALLERGIES: Patient has no known allergies. Review of Systems   Constitutional: Negative. Negative for activity change, chills, fatigue and unexpected weight change. HENT: Negative for trouble swallowing. Respiratory: Negative for cough, chest tightness, shortness of breath and wheezing. Cardiovascular: Negative. Negative for chest pain and palpitations. Gastrointestinal: Negative. Negative for abdominal pain, diarrhea, nausea and vomiting. Genitourinary: Negative. Negative for dysuria, flank pain, frequency and hematuria. Musculoskeletal: Negative. Negative for arthralgias, back pain, neck pain and neck stiffness. Skin: Negative. Negative for color change and rash. Neurological: Negative. Negative for dizziness, numbness and headaches. Tingling to L thigh   All other systems reviewed and are negative.       Vitals:    02/21/20 1308   BP: 135/77   Pulse: 84   Resp: 18   Temp: 97.9 °F (36.6 °C)   SpO2: 98% Physical Exam  Vitals signs and nursing note reviewed. Constitutional:       General: He is not in acute distress. Appearance: He is well-developed. He is not toxic-appearing or diaphoretic. HENT:      Head: Normocephalic and atraumatic. Eyes:      General:         Right eye: No discharge. Left eye: No discharge. Conjunctiva/sclera: Conjunctivae normal.      Pupils: Pupils are equal, round, and reactive to light. Neck:      Musculoskeletal: Full passive range of motion without pain and normal range of motion. Trachea: No tracheal tenderness. Cardiovascular:      Rate and Rhythm: Normal rate and regular rhythm. Pulses: Normal pulses. Heart sounds: Normal heart sounds. No murmur. No friction rub. No gallop. Pulmonary:      Effort: Pulmonary effort is normal. No respiratory distress. Breath sounds: Normal breath sounds. No wheezing or rales. Chest:      Chest wall: No tenderness. Abdominal:      General: Bowel sounds are normal. There is no distension. Palpations: Abdomen is soft. Tenderness: There is no abdominal tenderness. There is no guarding or rebound. Musculoskeletal: Normal range of motion. General: No tenderness. Lymphadenopathy:      Cervical: Cervical adenopathy (2 small mobile lymph nodes in R posterior cervical chain of SCM) present. Skin:     General: Skin is warm and dry. Capillary Refill: Capillary refill takes less than 2 seconds. Findings: No abrasion, erythema or rash. Comments: NVI throughout; no warmth or erythema; no rash. TTP of R deep gluteal muscle. Neurological:      Mental Status: He is alert and oriented to person, place, and time. Cranial Nerves: No cranial nerve deficit. Sensory: No sensory deficit.       Coordination: Coordination normal.   Psychiatric:         Speech: Speech normal.         Behavior: Behavior normal.          MDM  Number of Diagnoses or Management Options  Hypomagnesemia:   Paresthesia of left leg:   Diagnosis management comments:   Ddx: sciatica, paresthesia, electrolyte abnormality, dehydration       Amount and/or Complexity of Data Reviewed  Clinical lab tests: ordered and reviewed  Obtain history from someone other than the patient: yes  Review and summarize past medical records: yes  Discuss the patient with other providers: yes    Patient Progress  Patient progress: stable         Procedures    Mr. Patel Heading asking for me to sign paperwork to say he can go back to donating plasma. I advised him I am unsure the qualifications in donating plasma. I advised I am not qualified to medically say he can donate plasma and he needs to f/u with a pcp or other healthcare provider who is aware of qualifications in being able to donate plasma. I also advised him to stop doing cocaine, ETOH or other recreational drugs. LABORATORY TESTS:  Recent Results (from the past 12 hour(s))   SAMPLES BEING HELD    Collection Time: 02/21/20  2:34 PM   Result Value Ref Range    SAMPLES BEING HELD  1 BLUE, 1 RED     COMMENT        Add-on orders for these samples will be processed based on acceptable specimen integrity and analyte stability, which may vary by analyte. CK    Collection Time: 02/21/20  2:34 PM   Result Value Ref Range    CK 95 39 - 308 U/L   CBC WITH AUTOMATED DIFF    Collection Time: 02/21/20  2:34 PM   Result Value Ref Range    WBC 8.9 4.1 - 11.1 K/uL    RBC 4.81 4.10 - 5.70 M/uL    HGB 13.8 12.1 - 17.0 g/dL    HCT 42.0 36.6 - 50.3 %    MCV 87.3 80.0 - 99.0 FL    MCH 28.7 26.0 - 34.0 PG    MCHC 32.9 30.0 - 36.5 g/dL    RDW 14.0 11.5 - 14.5 %    PLATELET 973 932 - 685 K/uL    MPV 10.0 8.9 - 12.9 FL    NRBC 0.0 0  WBC    ABSOLUTE NRBC 0.00 0.00 - 0.01 K/uL    NEUTROPHILS 64 32 - 75 %    LYMPHOCYTES 23 12 - 49 %    MONOCYTES 7 5 - 13 %    EOSINOPHILS 5 0 - 7 %    BASOPHILS 1 0 - 1 %    IMMATURE GRANULOCYTES 0 0.0 - 0.5 %    ABS.  NEUTROPHILS 5.7 1.8 - 8.0 K/UL    ABS. LYMPHOCYTES 2.1 0.8 - 3.5 K/UL    ABS. MONOCYTES 0.7 0.0 - 1.0 K/UL    ABS. EOSINOPHILS 0.4 0.0 - 0.4 K/UL    ABS. BASOPHILS 0.1 0.0 - 0.1 K/UL    ABS. IMM. GRANS. 0.0 0.00 - 0.04 K/UL    DF AUTOMATED     METABOLIC PANEL, COMPREHENSIVE    Collection Time: 02/21/20  2:34 PM   Result Value Ref Range    Sodium 140 136 - 145 mmol/L    Potassium 4.0 3.5 - 5.1 mmol/L    Chloride 109 (H) 97 - 108 mmol/L    CO2 29 21 - 32 mmol/L    Anion gap 2 (L) 5 - 15 mmol/L    Glucose 99 65 - 100 mg/dL    BUN 8 6 - 20 MG/DL    Creatinine 0.94 0.70 - 1.30 MG/DL    BUN/Creatinine ratio 9 (L) 12 - 20      GFR est AA >60 >60 ml/min/1.73m2    GFR est non-AA >60 >60 ml/min/1.73m2    Calcium 9.0 8.5 - 10.1 MG/DL    Bilirubin, total 0.2 0.2 - 1.0 MG/DL    ALT (SGPT) 33 12 - 78 U/L    AST (SGOT) 13 (L) 15 - 37 U/L    Alk. phosphatase 80 45 - 117 U/L    Protein, total 7.2 6.4 - 8.2 g/dL    Albumin 3.4 (L) 3.5 - 5.0 g/dL    Globulin 3.8 2.0 - 4.0 g/dL    A-G Ratio 0.9 (L) 1.1 - 2.2     MAGNESIUM    Collection Time: 02/21/20  2:34 PM   Result Value Ref Range    Magnesium 1.5 (L) 1.6 - 2.4 mg/dL           DISCHARGE NOTE:  4:08 PM  The patient's results have been reviewed with them and/or available family. Patient and/or family verbally conveyed their understanding and agreement of the patient's signs, symptoms, diagnosis, treatment and prognosis and additionally agree to follow up as recommended in the discharge instructions or to return to the Emergency Room should their condition change prior to their follow-up appointment. The patient/family verbally agrees with the care-plan and verbally conveys that all of their questions have been answered.  The discharge instructions have also been provided to the patient and/or family with some educational information regarding the patient's diagnosis as well a list of reasons why the patient would want to return to the ER prior to their follow-up appointment, should their condition change. Plan:  1. F/U with pcp  2.  Return precautions discussed and advised to return to ER if worse

## 2020-02-21 NOTE — DISCHARGE INSTRUCTIONS
Patient Education        Numbness and Tingling: Care Instructions  Your Care Instructions    Many things can cause numbness or tingling. Swelling may put pressure on a nerve. This could cause you to lose feeling or have a pins-and-needles sensation on part of your body. Nerves may be damaged from trauma, toxins, or diseases, such as diabetes or multiple sclerosis (MS). Sometimes, though, the cause is not clear. If there is no clear reason for your symptoms, and you are not having any other symptoms, your doctor may suggest watching and waiting for a while to see if the numbness or tingling goes away on its own. Your doctor may want you to have blood or nerve tests to find the cause of your symptoms. Follow-up care is a key part of your treatment and safety. Be sure to make and go to all appointments, and call your doctor if you are having problems. It's also a good idea to know your test results and keep a list of the medicines you take. How can you care for yourself at home? · If your doctor prescribes medicine, take it exactly as directed. Call your doctor if you think you are having a problem with your medicine. · If you have any swelling, put ice or a cold pack on the area for 10 to 20 minutes at a time. Put a thin cloth between the ice and your skin. When should you call for help? Call 911 anytime you think you may need emergency care. For example, call if:    · You have weakness, numbness, or tingling in both legs.     · You lose bowel or bladder control.     · You have symptoms of a stroke. These may include:  ? Sudden numbness, tingling, weakness, or loss of movement in your face, arm, or leg, especially on only one side of your body. ? Sudden vision changes. ? Sudden trouble speaking. ? Sudden confusion or trouble understanding simple statements. ? Sudden problems with walking or balance.   ? A sudden, severe headache that is different from past headaches.    Watch closely for changes in your health, and be sure to contact your doctor if you have any problems, or if:    · You do not get better as expected. Where can you learn more? Go to http://selene-javon.info/. Enter V298 in the search box to learn more about \"Numbness and Tingling: Care Instructions. \"  Current as of: March 28, 2019  Content Version: 12.2  © 0564-4988 [employer]. Care instructions adapted under license by [id number] (which disclaims liability or warranty for this information). If you have questions about a medical condition or this instruction, always ask your healthcare professional. [address] any warranty or liability for your use of this information. Patient Education        Magnesium Test: About This Test  What is it? This test checks the level of magnesium in your blood. Magnesium is an important electrolyte. It's needed for proper muscle, nerve, and enzyme function. It also helps the body make and use energy. Most of the magnesium in the body is found in the bones and inside the cells. Only a tiny amount of magnesium is normally found in the blood. Why is this test done? This test is used to:  · Find a cause for nerve and muscle problems, such as muscle twitches, irritability, and muscle weakness. · Check how well your kidneys are working. · See if people with heart problems need extra magnesium. (Low magnesium levels can increase the chances of life-threatening heart rhythm problems.)  · Measure levels when magnesium is being given for medical treatment. How can you prepare for the test?  · Don't take medicines containing magnesium for at least 3 days before this test. This includes:  ? Antacids that contain magnesium. ? Laxatives (such as milk of magnesia or Epsom salts). ? Magnesium supplements. ? Some diuretics. What happens during the test?  · A health professional takes a sample of your blood.   What else should you know about the test?  · Your results will include an explanation of what a \"normal\" result is. This is called a \"reference range. \" It is just a guide. Your doctor will evaluate your results based on your health and other factors. This means that a value that falls outside the normal values listed may still be normal for you. How long does the test take? The test will take a few minutes. What happens after the test?  · You will probably be able to go home right away. · You can go back to your usual activities right away. Follow-up care is a key part of your treatment and safety. Be sure to make and go to all appointments, and call your doctor if you are having problems. It's also a good idea to keep a list of the medicines you take. Ask your doctor when you can expect to have your test results. Where can you learn more? Go to http://selene-javon.info/. Enter D173 in the search box to learn more about \"Magnesium Test: About This Test.\"  Current as of: March 28, 2019  Content Version: 12.2  © 9955-8432 [employer]. Care instructions adapted under license by [employer] (which disclaims liability or warranty for this information). If you have questions about a medical condition or this instruction, always ask your healthcare professional. [address] any warranty or liability for your use of this information.

## 2021-03-26 ENCOUNTER — TRANSCRIBE ORDER (OUTPATIENT)
Dept: REGISTRATION | Age: 41
End: 2021-03-26

## 2021-03-26 ENCOUNTER — HOSPITAL ENCOUNTER (OUTPATIENT)
Dept: GENERAL RADIOLOGY | Age: 41
Discharge: HOME | End: 2021-03-26
Payer: MEDICAID

## 2021-03-26 DIAGNOSIS — J40 BRONCHITIS: ICD-10-CM

## 2021-03-26 DIAGNOSIS — J40 BRONCHITIS: Primary | ICD-10-CM

## 2021-03-26 PROCEDURE — 71046 X-RAY EXAM CHEST 2 VIEWS: CPT

## 2021-12-17 ENCOUNTER — HOSPITAL ENCOUNTER (EMERGENCY)
Age: 41
Discharge: HOME | End: 2021-12-17
Attending: EMERGENCY MEDICINE | Admitting: EMERGENCY MEDICINE
Payer: MEDICAID

## 2021-12-17 ENCOUNTER — APPOINTMENT (OUTPATIENT)
Dept: CT IMAGING | Age: 41
End: 2021-12-17
Attending: EMERGENCY MEDICINE
Payer: MEDICAID

## 2021-12-17 ENCOUNTER — APPOINTMENT (OUTPATIENT)
Dept: GENERAL RADIOLOGY | Age: 41
End: 2021-12-17
Attending: EMERGENCY MEDICINE
Payer: MEDICAID

## 2021-12-17 VITALS
RESPIRATION RATE: 16 BRPM | HEART RATE: 84 BPM | OXYGEN SATURATION: 99 % | TEMPERATURE: 98.1 F | WEIGHT: 165 LBS | HEIGHT: 72 IN | SYSTOLIC BLOOD PRESSURE: 119 MMHG | DIASTOLIC BLOOD PRESSURE: 90 MMHG | BODY MASS INDEX: 22.35 KG/M2

## 2021-12-17 DIAGNOSIS — J18.9 COMMUNITY ACQUIRED PNEUMONIA OF LEFT LOWER LOBE OF LUNG: Primary | ICD-10-CM

## 2021-12-17 DIAGNOSIS — F11.10 HEROIN ABUSE (HCC): ICD-10-CM

## 2021-12-17 LAB
ALBUMIN SERPL-MCNC: 3.9 G/DL (ref 3.5–5)
ALBUMIN/GLOB SERPL: 0.9 {RATIO} (ref 1.1–2.2)
ALP SERPL-CCNC: 80 U/L (ref 45–117)
ALT SERPL-CCNC: 21 U/L (ref 12–78)
AMMONIA PLAS-SCNC: 29 UMOL/L
AMPHET UR QL SCN: NEGATIVE
ANION GAP SERPL CALC-SCNC: 7 MMOL/L (ref 5–15)
APPEARANCE UR: CLEAR
AST SERPL-CCNC: 20 U/L (ref 15–37)
BACTERIA URNS QL MICRO: NEGATIVE /HPF
BARBITURATES UR QL SCN: NEGATIVE
BASOPHILS # BLD: 0 K/UL (ref 0–0.1)
BASOPHILS NFR BLD: 0 % (ref 0–1)
BENZODIAZ UR QL: NEGATIVE
BILIRUB SERPL-MCNC: 0.4 MG/DL (ref 0.2–1)
BILIRUB UR QL: NEGATIVE
BNP SERPL-MCNC: 50 PG/ML
BUN SERPL-MCNC: 10 MG/DL (ref 6–20)
BUN/CREAT SERPL: 11 (ref 12–20)
CALCIUM SERPL-MCNC: 9.8 MG/DL (ref 8.5–10.1)
CANNABINOIDS UR QL SCN: POSITIVE
CHLORIDE SERPL-SCNC: 92 MMOL/L (ref 97–108)
CK SERPL-CCNC: 229 U/L (ref 39–308)
CO2 SERPL-SCNC: 31 MMOL/L (ref 21–32)
COCAINE UR QL SCN: POSITIVE
COLOR UR: ABNORMAL
COMMENT, HOLDF: NORMAL
CREAT SERPL-MCNC: 0.87 MG/DL (ref 0.7–1.3)
DIFFERENTIAL METHOD BLD: ABNORMAL
DRUG SCRN COMMENT,DRGCM: ABNORMAL
EOSINOPHIL # BLD: 0 K/UL (ref 0–0.4)
EOSINOPHIL NFR BLD: 0 % (ref 0–7)
EPITH CASTS URNS QL MICRO: ABNORMAL /LPF
ERYTHROCYTE [DISTWIDTH] IN BLOOD BY AUTOMATED COUNT: 12.9 % (ref 11.5–14.5)
ETHANOL SERPL-MCNC: <10 MG/DL
GLOBULIN SER CALC-MCNC: 4.3 G/DL (ref 2–4)
GLUCOSE SERPL-MCNC: 108 MG/DL (ref 65–100)
GLUCOSE UR STRIP.AUTO-MCNC: NEGATIVE MG/DL
HCT VFR BLD AUTO: 47.7 % (ref 36.6–50.3)
HGB BLD-MCNC: 16.1 G/DL (ref 12.1–17)
HGB UR QL STRIP: NEGATIVE
IMM GRANULOCYTES # BLD AUTO: 0.1 K/UL (ref 0–0.04)
IMM GRANULOCYTES NFR BLD AUTO: 0 % (ref 0–0.5)
KETONES UR QL STRIP.AUTO: >80 MG/DL
LEUKOCYTE ESTERASE UR QL STRIP.AUTO: NEGATIVE
LIPASE SERPL-CCNC: 106 U/L (ref 73–393)
LYMPHOCYTES # BLD: 1.1 K/UL (ref 0.8–3.5)
LYMPHOCYTES NFR BLD: 7 % (ref 12–49)
MAGNESIUM SERPL-MCNC: 1.9 MG/DL (ref 1.6–2.4)
MCH RBC QN AUTO: 29.2 PG (ref 26–34)
MCHC RBC AUTO-ENTMCNC: 33.8 G/DL (ref 30–36.5)
MCV RBC AUTO: 86.6 FL (ref 80–99)
METHADONE UR QL: NEGATIVE
MONOCYTES # BLD: 1.2 K/UL (ref 0–1)
MONOCYTES NFR BLD: 9 % (ref 5–13)
NEUTS SEG # BLD: 12.1 K/UL (ref 1.8–8)
NEUTS SEG NFR BLD: 84 % (ref 32–75)
NITRITE UR QL STRIP.AUTO: NEGATIVE
NRBC # BLD: 0 K/UL (ref 0–0.01)
NRBC BLD-RTO: 0 PER 100 WBC
OPIATES UR QL: NEGATIVE
PCP UR QL: NEGATIVE
PH UR STRIP: >8.5 [PH] (ref 5–8)
PLATELET # BLD AUTO: 269 K/UL (ref 150–400)
PMV BLD AUTO: 10.2 FL (ref 8.9–12.9)
POTASSIUM SERPL-SCNC: 3.2 MMOL/L (ref 3.5–5.1)
PROCALCITONIN SERPL-MCNC: 1.77 NG/ML
PROT SERPL-MCNC: 8.2 G/DL (ref 6.4–8.2)
PROT UR STRIP-MCNC: 30 MG/DL
RBC # BLD AUTO: 5.51 M/UL (ref 4.1–5.7)
RBC #/AREA URNS HPF: ABNORMAL /HPF (ref 0–5)
SAMPLES BEING HELD,HOLD: NORMAL
SODIUM SERPL-SCNC: 130 MMOL/L (ref 136–145)
SP GR UR REFRACTOMETRY: 1.02 (ref 1–1.03)
TROPONIN-HIGH SENSITIVITY: 8 NG/L (ref 0–76)
UR CULT HOLD, URHOLD: NORMAL
UROBILINOGEN UR QL STRIP.AUTO: 0.2 EU/DL (ref 0.2–1)
WBC # BLD AUTO: 14.5 K/UL (ref 4.1–11.1)
WBC URNS QL MICRO: ABNORMAL /HPF (ref 0–4)

## 2021-12-17 PROCEDURE — 80307 DRUG TEST PRSMV CHEM ANLYZR: CPT

## 2021-12-17 PROCEDURE — 80053 COMPREHEN METABOLIC PANEL: CPT

## 2021-12-17 PROCEDURE — 70450 CT HEAD/BRAIN W/O DYE: CPT

## 2021-12-17 PROCEDURE — 81001 URINALYSIS AUTO W/SCOPE: CPT

## 2021-12-17 PROCEDURE — 82140 ASSAY OF AMMONIA: CPT

## 2021-12-17 PROCEDURE — 74011250637 HC RX REV CODE- 250/637: Performed by: EMERGENCY MEDICINE

## 2021-12-17 PROCEDURE — 82550 ASSAY OF CK (CPK): CPT

## 2021-12-17 PROCEDURE — 83690 ASSAY OF LIPASE: CPT

## 2021-12-17 PROCEDURE — 71046 X-RAY EXAM CHEST 2 VIEWS: CPT

## 2021-12-17 PROCEDURE — 85025 COMPLETE CBC W/AUTO DIFF WBC: CPT

## 2021-12-17 PROCEDURE — 36415 COLL VENOUS BLD VENIPUNCTURE: CPT

## 2021-12-17 PROCEDURE — 99283 EMERGENCY DEPT VISIT LOW MDM: CPT

## 2021-12-17 PROCEDURE — 83735 ASSAY OF MAGNESIUM: CPT

## 2021-12-17 PROCEDURE — 82077 ASSAY SPEC XCP UR&BREATH IA: CPT

## 2021-12-17 PROCEDURE — 74011000250 HC RX REV CODE- 250: Performed by: EMERGENCY MEDICINE

## 2021-12-17 PROCEDURE — 84484 ASSAY OF TROPONIN QUANT: CPT

## 2021-12-17 PROCEDURE — 83880 ASSAY OF NATRIURETIC PEPTIDE: CPT

## 2021-12-17 PROCEDURE — 96374 THER/PROPH/DIAG INJ IV PUSH: CPT

## 2021-12-17 PROCEDURE — 74011250636 HC RX REV CODE- 250/636: Performed by: EMERGENCY MEDICINE

## 2021-12-17 PROCEDURE — 84145 PROCALCITONIN (PCT): CPT

## 2021-12-17 RX ORDER — AZITHROMYCIN 250 MG/1
250 TABLET, FILM COATED ORAL DAILY
Qty: 4 TABLET | Refills: 0 | Status: SHIPPED | OUTPATIENT
Start: 2021-12-17 | End: 2021-12-21

## 2021-12-17 RX ORDER — AZITHROMYCIN 250 MG/1
500 TABLET, FILM COATED ORAL
Status: COMPLETED | OUTPATIENT
Start: 2021-12-17 | End: 2021-12-17

## 2021-12-17 RX ADMIN — AZITHROMYCIN MONOHYDRATE 500 MG: 250 TABLET ORAL at 21:11

## 2021-12-17 RX ADMIN — CEFTRIAXONE SODIUM 1 G: 1 INJECTION, POWDER, FOR SOLUTION INTRAMUSCULAR; INTRAVENOUS at 21:10

## 2021-12-17 NOTE — ED PROVIDER NOTES
Please note that this dictation was completed with [employer], the computer voice recognition software.  Quite often unanticipated grammatical, syntax, homophones, and other interpretive errors are inadvertently transcribed by the computer software.  Please disregard these errors.  Please excuse any errors that have escaped final proofreading. 26-year-old male past medical history markable for previous STEMI, heroin abuse alcohol abuse other substances presents the ER POV with his mother complaining of \" was seen earlier today in office and everything just cannot locked up on me. \"  Patient states he was unable to walk unable to move normally. Patient states he is noted some improvement in his symptoms since that time it was approximately 1 or 2 PM or 3 PM today. Patient still feels like \"kind of aching all over. \"  Patient denies taking anything for symptoms denies other current systemic complaints. Patient does admit to drinking some alcohol. Patient's mother then adds \"he came home the other night Wednesday night had been doing heroin. He laid down on the floor was in the floor and was smacking his face and was unable to get him up so I put a blanket on him and he slept on the floor all night. Seem to be doing okay then yesterday was drinking some alcohol and other stuff. \"    Patient states he sniffs the heroin does not use any IV drugs. pt denies HA, vison changes, diff swallowing, CP, SOB, Abd pain, F/Ch, N/V, D/Cons or other current systemic complaints    Social/ PSH reviewed in EMR    EMR Chart Reviewed           No past medical history on file. Past Surgical History:   Procedure Laterality Date    HX ORTHOPAEDIC      Right ankle sugery         No family history on file.     Social History     Socioeconomic History    Marital status: SINGLE     Spouse name: Not on file    Number of children: Not on file    Years of education: Not on file    Highest education level: Not on file   Occupational History    Not on file   Tobacco Use    Smoking status: Current Every Day Smoker    Smokeless tobacco: Not on file   Substance and Sexual Activity    Alcohol use: Yes     Comment: social    Drug use: Not on file    Sexual activity: Not on file   Other Topics Concern    Not on file   Social History Narrative    Not on file     Social Determinants of Health     Financial Resource Strain:     Difficulty of Paying Living Expenses: Not on file   Food Insecurity:     Worried About Running Out of Food in the Last Year: Not on file    Ajith of Food in the Last Year: Not on file   Transportation Needs:     Lack of Transportation (Medical): Not on file    Lack of Transportation (Non-Medical): Not on file   Physical Activity:     Days of Exercise per Week: Not on file    Minutes of Exercise per Session: Not on file   Stress:     Feeling of Stress : Not on file   Social Connections:     Frequency of Communication with Friends and Family: Not on file    Frequency of Social Gatherings with Friends and Family: Not on file    Attends [employer] Services: Not on file    Active Member of [address] [place] [address] or Organizations: Not on file    Attends Club or Organization Meetings: Not on file    Marital Status: Not on file   Intimate Partner Violence:     Fear of Current or Ex-Partner: Not on file    Emotionally Abused: Not on file    Physically Abused: Not on file    Sexually Abused: Not on file   Housing Stability:     Unable to Pay for Housing in the Last Year: Not on file    Number of [place] in the Last Year: Not on file    Unstable Housing in the Last Year: Not on file         ALLERGIES: Patient has no known allergies. Review of Systems   Constitutional: Negative for chills and fever. HENT: Negative for drooling, trouble swallowing and voice change. Eyes: Negative for visual disturbance. Respiratory: Negative for chest tightness. Gastrointestinal: Negative for diarrhea, nausea and vomiting. Genitourinary: Negative for dysuria. Musculoskeletal: Positive for arthralgias and myalgias. Neurological: Negative for dizziness, seizures, facial asymmetry and speech difficulty. All other systems reviewed and are negative. Vitals:    12/17/21 1646   BP: (!) 131/90   Pulse: 95   Resp: 18   Temp: 98.5 °F (36.9 °C)   SpO2: 98%   Weight: 74.8 kg (165 lb)   Height: 6' (1.829 m)            Physical Exam  Vitals and nursing note reviewed. Constitutional:       General: He is not in acute distress. Appearance: Normal appearance. He is well-developed. He is not ill-appearing, toxic-appearing or diaphoretic. Comments: Disheveled, NAD, AxOx4, speaking in complete sentences    Ambulating with ease      gcs = 15   HENT:      Head: Normocephalic and atraumatic. Comments: Cn intact         Right Ear: External ear normal.      Left Ear: External ear normal.      Nose: Nose normal.      Mouth/Throat:      Pharynx: No oropharyngeal exudate. Eyes:      General:         Right eye: No discharge. Left eye: No discharge. Conjunctiva/sclera: Conjunctivae normal.      Pupils: Pupils are equal, round, and reactive to light. Cardiovascular:      Rate and Rhythm: Normal rate and regular rhythm. Pulses: Normal pulses. Heart sounds: Normal heart sounds. No murmur heard. No friction rub. No gallop. Pulmonary:      Effort: Pulmonary effort is normal. No respiratory distress. Breath sounds: Normal breath sounds. No stridor. No wheezing, rhonchi or rales. Chest:      Chest wall: No tenderness. Abdominal:      General: Bowel sounds are normal. There is no distension. Palpations: Abdomen is soft. There is no mass. Tenderness: There is no abdominal tenderness. There is no guarding or rebound. Genitourinary:     Comments: Pt denies urinary/ Testicular/ scrotal or penile  complaints  Musculoskeletal:         General: No deformity or signs of injury.  Normal range of motion. Cervical back: Normal range of motion and neck supple. Right lower leg: No edema. Left lower leg: No edema. Lymphadenopathy:      Cervical: No cervical adenopathy. Skin:     General: Skin is warm and dry. Capillary Refill: Capillary refill takes less than 2 seconds. Coloration: Skin is not jaundiced or pale. Findings: No bruising, erythema, lesion or rash. Neurological:      General: No focal deficit present. Mental Status: He is alert and oriented to person, place, and time. Cranial Nerves: No cranial nerve deficit. Motor: No weakness. Coordination: Coordination normal.      Gait: Gait normal.          MDM       Procedures    Chief Complaint   Patient presents with    Drug Overdose       5:02 PM  The patients presenting problems have been discussed, and they are in agreement with the care plan formulated and outlined with them. I have encouraged them to ask questions as they arise throughout their visit. MEDICATIONS GIVEN:  Medications - No data to display    LABS REVIEWED:  Labs Reviewed - No data to display    RADIOLOGY RESULTS:  The following have been ordered and reviewed:  _____________________________________________________________________  _____________________________________________________________________        PROCEDURES:        CONSULTATIONS:       PROGRESS NOTES:      DIAGNOSIS:    1. Community acquired pneumonia of left lower lobe of lung    2. Heroin abuse ([facility] [address]        PLAN:  1- CAP - Roce/ azith;   2 heroin abuse - advised to stop;       ED COURSE: The patients hospital course has been uncomplicated. 8:37 PM patient told results need for antibiotics. Patient agrees with plans outpatient azithromycin doses. \"We will follow up with PCP. \"  Marion Jim  results have been reviewed with him. He has been counseled regarding his diagnosis.   He verbally conveys understanding and agreement of the signs, symptoms, diagnosis, treatment and prognosis and additionally agrees to Call/ Arrange follow up as recommended with Dr. None in 24 - 48 hours. He also agrees with the care-plan and conveys that all of his questions have been answered. I have also put together some discharge instructions for him that include: 1) educational information regarding their diagnosis, 2) how to care for their diagnosis at home, as well a 3) list of reasons why they would want to return to the ED prior to their follow-up appointment, should their condition change or for concerns.

## 2021-12-17 NOTE — ED TRIAGE NOTES
Pt reports last used heroin on Wednesday, per mother he had a few syncopal episodes. Pt reports he drank Wednesday and yesterday but not today. Pt reports he smokes marijuana everyday. Per mother she picked him around 3 he got in the car and when they arrived at their destination he was unable to walk and his mother brought him here. Pt is sweaty in triage. Pt denies heroin use today.

## 2021-12-18 NOTE — DISCHARGE INSTRUCTIONS
Thank you for allowing us to provide you with medical care today. We realize that you have many choices for your emergency care needs. We thank you for choosing [facility].  Please choose us in the future for any continued health care needs. We hope we addressed all of your medical concerns. We strive to provide excellent quality care in the Emergency Department. Anything less than excellent does not meet our expectations. The exam and treatment you received in the Emergency Department were for an emergent problem and are not intended as complete care. It is important that you follow up with a doctor, nurse practitioner, or  [id number] assistant for ongoing care. If your symptoms worsen or you do not improve as expected and you are unable to reach your usual health care provider, you should return to the Emergency Department. We are available 24 hours a day. Take this sheet with you when you go to your follow-up visit. If you have any problem arranging the follow-up visit, contact the Emergency Department immediately. Make an appointment your family doctor for follow up of this visit. Return to the ER if you are unable to be seen in a timely manner.

## 2022-03-20 PROBLEM — R41.82 ALTERED MENTAL STATUS: Status: ACTIVE | Noted: 2020-02-02

## 2023-05-25 RX ORDER — ONDANSETRON 4 MG/1
4 TABLET, ORALLY DISINTEGRATING ORAL EVERY 8 HOURS PRN
COMMUNITY
Start: 2018-03-21

## 2024-03-21 ENCOUNTER — APPOINTMENT (OUTPATIENT)
Facility: HOSPITAL | Age: 44
End: 2024-03-21
Payer: MEDICAID

## 2024-03-21 ENCOUNTER — HOSPITAL ENCOUNTER (EMERGENCY)
Facility: HOSPITAL | Age: 44
Discharge: HOME | End: 2024-03-21
Attending: EMERGENCY MEDICINE
Payer: MEDICAID

## 2024-03-21 VITALS
HEIGHT: 71 IN | TEMPERATURE: 98.4 F | WEIGHT: 169.09 LBS | SYSTOLIC BLOOD PRESSURE: 139 MMHG | RESPIRATION RATE: 18 BRPM | BODY MASS INDEX: 23.67 KG/M2 | DIASTOLIC BLOOD PRESSURE: 85 MMHG | HEART RATE: 63 BPM | OXYGEN SATURATION: 100 %

## 2024-03-21 DIAGNOSIS — S40.012A CONTUSION OF MULTIPLE SITES OF LEFT SHOULDER AND UPPER ARM, INITIAL ENCOUNTER: Primary | ICD-10-CM

## 2024-03-21 DIAGNOSIS — S40.022A CONTUSION OF MULTIPLE SITES OF LEFT SHOULDER AND UPPER ARM, INITIAL ENCOUNTER: Primary | ICD-10-CM

## 2024-03-21 DIAGNOSIS — S40.812A ABRASION OF LEFT UPPER EXTREMITY, INITIAL ENCOUNTER: ICD-10-CM

## 2024-03-21 PROCEDURE — 99284 EMERGENCY DEPT VISIT MOD MDM: CPT

## 2024-03-21 PROCEDURE — 6370000000 HC RX 637 (ALT 250 FOR IP): Performed by: PHYSICIAN ASSISTANT

## 2024-03-21 PROCEDURE — 73030 X-RAY EXAM OF SHOULDER: CPT

## 2024-03-21 PROCEDURE — 90714 TD VACC NO PRESV 7 YRS+ IM: CPT | Performed by: PHYSICIAN ASSISTANT

## 2024-03-21 PROCEDURE — 6360000002 HC RX W HCPCS: Performed by: PHYSICIAN ASSISTANT

## 2024-03-21 PROCEDURE — 90471 IMMUNIZATION ADMIN: CPT | Performed by: PHYSICIAN ASSISTANT

## 2024-03-21 PROCEDURE — 96372 THER/PROPH/DIAG INJ SC/IM: CPT

## 2024-03-21 RX ORDER — IBUPROFEN 600 MG/1
600 TABLET ORAL EVERY 6 HOURS PRN
Qty: 25 TABLET | Refills: 0 | Status: SHIPPED | OUTPATIENT
Start: 2024-03-21

## 2024-03-21 RX ORDER — TETANUS AND DIPHTHERIA TOXOIDS ADSORBED 2; 2 [LF]/.5ML; [LF]/.5ML
0.5 INJECTION INTRAMUSCULAR ONCE
Status: COMPLETED | OUTPATIENT
Start: 2024-03-21 | End: 2024-03-21

## 2024-03-21 RX ORDER — GINSENG 100 MG
CAPSULE ORAL
Status: COMPLETED | OUTPATIENT
Start: 2024-03-21 | End: 2024-03-21

## 2024-03-21 RX ORDER — HYDROCODONE BITARTRATE AND ACETAMINOPHEN 5; 325 MG/1; MG/1
1 TABLET ORAL EVERY 8 HOURS PRN
Qty: 6 TABLET | Refills: 0 | Status: SHIPPED | OUTPATIENT
Start: 2024-03-21 | End: 2024-03-22

## 2024-03-21 RX ORDER — KETOROLAC TROMETHAMINE 30 MG/ML
30 INJECTION, SOLUTION INTRAMUSCULAR; INTRAVENOUS
Status: COMPLETED | OUTPATIENT
Start: 2024-03-21 | End: 2024-03-21

## 2024-03-21 RX ORDER — OXYCODONE HYDROCHLORIDE AND ACETAMINOPHEN 5; 325 MG/1; MG/1
1 TABLET ORAL
Status: COMPLETED | OUTPATIENT
Start: 2024-03-21 | End: 2024-03-21

## 2024-03-21 RX ADMIN — OXYCODONE HYDROCHLORIDE AND ACETAMINOPHEN 1 TABLET: 5; 325 TABLET ORAL at 18:53

## 2024-03-21 RX ADMIN — TETANUS AND DIPHTHERIA TOXOIDS ADSORBED 0.5 ML: 2; 2 INJECTION INTRAMUSCULAR at 18:59

## 2024-03-21 RX ADMIN — Medication 3 ML: at 19:09

## 2024-03-21 RX ADMIN — KETOROLAC TROMETHAMINE 30 MG: 30 INJECTION, SOLUTION INTRAMUSCULAR; INTRAVENOUS at 18:54

## 2024-03-21 RX ADMIN — BACITRACIN: 500 OINTMENT TOPICAL at 20:31

## 2024-03-21 ASSESSMENT — PAIN SCALES - GENERAL
PAINLEVEL_OUTOF10: 10
PAINLEVEL_OUTOF10: 10
PAINLEVEL_OUTOF10: 9

## 2024-03-21 ASSESSMENT — PAIN DESCRIPTION - LOCATION
LOCATION: ARM
LOCATION: SHOULDER

## 2024-03-21 ASSESSMENT — PAIN DESCRIPTION - ORIENTATION
ORIENTATION: LEFT
ORIENTATION: LEFT

## 2024-03-21 ASSESSMENT — PAIN DESCRIPTION - ONSET: ONSET: SUDDEN

## 2024-03-21 ASSESSMENT — PAIN DESCRIPTION - PAIN TYPE: TYPE: ACUTE PAIN

## 2024-03-21 ASSESSMENT — PAIN DESCRIPTION - DESCRIPTORS: DESCRIPTORS: ACHING;CRUSHING

## 2024-03-21 ASSESSMENT — PAIN - FUNCTIONAL ASSESSMENT
PAIN_FUNCTIONAL_ASSESSMENT: PREVENTS OR INTERFERES WITH ALL ACTIVE AND SOME PASSIVE ACTIVITIES
PAIN_FUNCTIONAL_ASSESSMENT: 0-10

## 2024-03-21 ASSESSMENT — PAIN DESCRIPTION - FREQUENCY: FREQUENCY: CONTINUOUS

## 2024-03-21 NOTE — ED TRIAGE NOTES
Patient arrives to ER with c/o right shoulder pain. Says he had 8 doors fall on him at work. Has laceration to arm.

## 2024-03-21 NOTE — ED PROVIDER NOTES
[facility] EMERGENCY DEP  EMERGENCY DEPARTMENT ENCOUNTER      Pt Name: Marcos Henry  MRN: [id number]  Birthdate 1980  Date of evaluation: 3/21/2024  Provider: MAYURI Louise    CHIEF COMPLAINT       Chief Complaint   Patient presents with    Laceration         HISTORY OF PRESENT ILLNESS   (Location/Symptom, Timing/Onset, Context/Setting, Quality, Duration, Modifying Factors, Severity)  Note limiting factors.       43-year-old right-handed male presenting to the ED for left shoulder pain.  Patient reports that a couple of hours ago he was at work when a stack of doors that was leaning against a wall fell against his left shoulder.  Patient reports moderately severe pain in the left shoulder, worse with palpation and movement.  No LOC.  No treatment prior to arrival.  Unsure of last tetanus.  No other concerns.        PMHx:   Psx: R ankle, R shoulder (last year, [facility])  Social; + smoker. + alcohol - maybe a 40 each day.  + marijuana.  Rufiin and mccord.    NKDA    The history is provided by the patient and medical records.         Review of External Medical Records:     Nursing Notes were reviewed.    REVIEW OF SYSTEMS    (2-9 systems for level 4, 10 or more for level 5)     Review of Systems   Musculoskeletal:  Positive for arthralgias.   Skin:  Positive for wound.   All other systems reviewed and are negative.      Except as noted above the remainder of the review of systems was reviewed and negative.       PAST MEDICAL HISTORY   No past medical history on file.      SURGICAL HISTORY       Past Surgical History:   Procedure Laterality Date    ORTHOPEDIC SURGERY      Right ankle sugery         CURRENT MEDICATIONS       Discharge Medication List as of 3/21/2024  7:47 PM        CONTINUE these medications which have NOT CHANGED    Details   ondansetron (ZOFRAN-ODT) 4 MG disintegrating tablet Take 1 tablet by mouth every 8 hours as neededHistorical Med             ALLERGIES     Patient has no known

## 2024-03-21 NOTE — DISCHARGE INSTRUCTIONS
Return for new or worsening symptoms.  Wear your sling for 5-7 days; on the days you wear your sling, remove the arm at least 5 times a day and move the arm as much as possible to prevent adhesive capsulitis (frozen shoulder).  Follow RICE instructions.  If not improving in 5-7 days, follow up with ortho at [facility].

## 2024-03-22 RX ORDER — HYDROCODONE BITARTRATE AND ACETAMINOPHEN 5; 325 MG/1; MG/1
1 TABLET ORAL EVERY 8 HOURS PRN
Qty: 6 TABLET | Refills: 0 | Status: SHIPPED | OUTPATIENT
Start: 2024-03-22 | End: 2024-03-24

## 2024-03-22 NOTE — ED NOTES
[facility] pharmacy called with reports that there was no supervising physician on the original prescription sent by another PA.  I have now resent this prescription with the supervising physician.    MELINDA Wilhelm, MELINDA Strange  03/22/24 2118

## (undated) DEVICE — ANGIOGRAPHIC CATHETER: Brand: IMPULSE™

## (undated) DEVICE — KIT HND CTRL 3 W STPCOCK ROT END 54IN PREM HI PRSS TBNG AT

## (undated) DEVICE — ANGIOGRAPHY KIT

## (undated) DEVICE — GUIDEWIRE VASC L260CM DIA0.035IN TIP L3MM STD EXCHG PTFE J

## (undated) DEVICE — KIT MFLD ISOLATN NACL CNTRST PRT TBNG SPIK W/ PRSS TRNSDUC

## (undated) DEVICE — SPECIAL PROCEDURE DRAPE 32" X 34": Brand: SPECIAL PROCEDURE DRAPE

## (undated) DEVICE — TR BAND RADIAL ARTERY COMPRESSION DEVICE: Brand: TR BAND

## (undated) DEVICE — KIT MED IMAG CNTRST AGNT W/ IOPAMIDOL REUSE

## (undated) DEVICE — PACK PROCEDURE SURG HRT CATH

## (undated) DEVICE — SPLINT WR POS F/ARTERIAL ACC -- BX/10

## (undated) DEVICE — GLIDESHEATH SLENDER ACCESS KIT: Brand: GLIDESHEATH SLENDER